# Patient Record
Sex: FEMALE | Race: WHITE | NOT HISPANIC OR LATINO | Employment: FULL TIME | URBAN - METROPOLITAN AREA
[De-identification: names, ages, dates, MRNs, and addresses within clinical notes are randomized per-mention and may not be internally consistent; named-entity substitution may affect disease eponyms.]

---

## 2017-07-03 ENCOUNTER — OFFICE VISIT (OUTPATIENT)
Dept: FAMILY MEDICINE | Facility: CLINIC | Age: 52
End: 2017-07-03
Payer: COMMERCIAL

## 2017-07-03 VITALS
HEIGHT: 69 IN | OXYGEN SATURATION: 97 % | HEART RATE: 80 BPM | DIASTOLIC BLOOD PRESSURE: 68 MMHG | WEIGHT: 198 LBS | SYSTOLIC BLOOD PRESSURE: 102 MMHG | BODY MASS INDEX: 29.33 KG/M2 | TEMPERATURE: 97.7 F

## 2017-07-03 DIAGNOSIS — N76.0 BV (BACTERIAL VAGINOSIS): Primary | ICD-10-CM

## 2017-07-03 DIAGNOSIS — Z12.4 SCREENING FOR MALIGNANT NEOPLASM OF CERVIX: ICD-10-CM

## 2017-07-03 DIAGNOSIS — Z12.31 VISIT FOR SCREENING MAMMOGRAM: ICD-10-CM

## 2017-07-03 DIAGNOSIS — B96.89 BV (BACTERIAL VAGINOSIS): Primary | ICD-10-CM

## 2017-07-03 DIAGNOSIS — R35.0 URINARY FREQUENCY: ICD-10-CM

## 2017-07-03 DIAGNOSIS — N89.8 VAGINAL ITCHING: ICD-10-CM

## 2017-07-03 DIAGNOSIS — Z11.3 SCREEN FOR STD (SEXUALLY TRANSMITTED DISEASE): ICD-10-CM

## 2017-07-03 LAB
ALBUMIN UR-MCNC: NEGATIVE MG/DL
APPEARANCE UR: CLEAR
BILIRUB UR QL STRIP: NEGATIVE
COLOR UR AUTO: YELLOW
GLUCOSE UR STRIP-MCNC: NEGATIVE MG/DL
HGB UR QL STRIP: ABNORMAL
KETONES UR STRIP-MCNC: NEGATIVE MG/DL
LEUKOCYTE ESTERASE UR QL STRIP: NEGATIVE
MICRO REPORT STATUS: ABNORMAL
NITRATE UR QL: NEGATIVE
NON-SQ EPI CELLS #/AREA URNS LPF: ABNORMAL /LPF
PH UR STRIP: 5.5 PH (ref 5–7)
RBC #/AREA URNS AUTO: ABNORMAL /HPF (ref 0–2)
SP GR UR STRIP: 1.02 (ref 1–1.03)
SPECIMEN SOURCE: ABNORMAL
URN SPEC COLLECT METH UR: ABNORMAL
UROBILINOGEN UR STRIP-ACNC: 0.2 EU/DL (ref 0.2–1)
WBC #/AREA URNS AUTO: ABNORMAL /HPF (ref 0–2)
WET PREP SPEC: ABNORMAL

## 2017-07-03 PROCEDURE — 81001 URINALYSIS AUTO W/SCOPE: CPT | Performed by: PHYSICIAN ASSISTANT

## 2017-07-03 PROCEDURE — 87624 HPV HI-RISK TYP POOLED RSLT: CPT | Performed by: PHYSICIAN ASSISTANT

## 2017-07-03 PROCEDURE — 99214 OFFICE O/P EST MOD 30 MIN: CPT | Performed by: PHYSICIAN ASSISTANT

## 2017-07-03 PROCEDURE — 87591 N.GONORRHOEAE DNA AMP PROB: CPT | Performed by: PHYSICIAN ASSISTANT

## 2017-07-03 PROCEDURE — 88175 CYTOPATH C/V AUTO FLUID REDO: CPT | Performed by: PHYSICIAN ASSISTANT

## 2017-07-03 PROCEDURE — 87491 CHLMYD TRACH DNA AMP PROBE: CPT | Performed by: PHYSICIAN ASSISTANT

## 2017-07-03 PROCEDURE — 87210 SMEAR WET MOUNT SALINE/INK: CPT | Performed by: PHYSICIAN ASSISTANT

## 2017-07-03 RX ORDER — METRONIDAZOLE 500 MG/1
500 TABLET ORAL 2 TIMES DAILY
Qty: 14 TABLET | Refills: 0 | Status: SHIPPED | OUTPATIENT
Start: 2017-07-03 | End: 2018-04-05

## 2017-07-03 NOTE — NURSING NOTE
"Chief Complaint   Patient presents with     UTI     possible bladder infection or yeast infection per pt x 1-2 months on and off       Initial /68  Pulse 80  Temp 97.7  F (36.5  C) (Oral)  Ht 5' 9\" (1.753 m)  Wt 198 lb (89.8 kg)  SpO2 97%  Breastfeeding? No  BMI 29.24 kg/m2 Estimated body mass index is 29.24 kg/(m^2) as calculated from the following:    Height as of this encounter: 5' 9\" (1.753 m).    Weight as of this encounter: 198 lb (89.8 kg).  Medication Reconciliation: complete      Giacomo Gooden MA    "

## 2017-07-03 NOTE — LETTER
[facility]  [address] [facility] [id number] [phone]        July 6, 2017    Layne Warren  [address]   [facility] [address]            Dear Layne,    The results of your recent tests were normal.  It was a pleasure to see you at your last appointment.    If you have any questions or concerns, please call myself or my nurse at [phone].    Sincerely,    Chasidy Scott PA-C

## 2017-07-03 NOTE — PATIENT INSTRUCTIONS
Bacterial Vaginosis    You have a vaginal infection called bacterial vaginosis (BV). Both good and bad bacteria are present in a healthy vagina. BV occurs when these bacteria get out of balance. The number of bad bacteria increase. And the number of good bacteria decrease.  BV may or may not cause symptoms. If symptoms do occur, they can include:    Thin, gray, milky-white, or sometimes green discharge    Unpleasant odor or  fishy  smell    Itching, burning, or pain in or around the vagina  It is not known what causes BV, but certain factors can make the problem more likely. This can include:    Douching    Having sex with a new partner    Having sex with more than one partner  BV will sometimes go away on its own. But treatment is usually recommended. This is because untreated BV can increase the risk of more serious health problems such as:    Pelvic inflammatory disease (PID)     delivery (giving birth to a baby early if you re pregnant)    HIV and certain other sexually transmitted diseases (STDs)    Infection after surgery on the reproductive organs  Home care  General care    BV is most often treated with medicines called antibiotics. These may be given as pills or as a vaginal cream. If antibiotics are prescribed, be sure to use them exactly as directed. Also, be sure to complete all of the medicine, even if your symptoms go away.    Avoid douching or having sex during treatment.    If you have sex with a female partner, ask your healthcare provider if she should also be treated.  Prevention    Limit or avoid douching.    Avoid having sex. If you do have sex, then take steps to lower your risk:    Use condoms when having sex.    Limit the number of partners you have sex with.  Follow-up care  Follow up with your healthcare provider, or as advised.  When to seek medical advice  Call your healthcare provider right away if:    You have a fever of 100.4 F (38 C) or higher, or as directed by your  provider.    Your symptoms worsen, or they don t go away within a few days of starting treatment.    You have new pain in the lower belly or pelvic region.    You have side effects that bother you or a reaction to the pills or cream you re prescribed.    You or any partners you have sex with have new symptoms, such as a rash, joint pain, or sores.  Date Last Reviewed: 7/30/2015 2000-2017 [employer]. [address] [place] [address], [place] [address]. All rights reserved. This information is not intended as a substitute for professional medical care. Always follow your healthcare professional's instructions.

## 2017-07-03 NOTE — LETTER
July 17, 2017    Layne Warren  [address]   [place] [address]    Dear Layne,  We are happy to inform you that your PAP smear result from 7/3/17 is normal.  We are now able to do a follow up test on PAP smears. The DNA test is for HPV (Human Papilloma Virus). Cervical cancer is closely linked with certain types of HPV. Your result showed no evidence of high risk HPV.  Therefore we recommend you return in 3 years for your next pap smear and HPV test.  You will still need to return to the clinic every year for an annual exam and other preventive tests.  Please contact the clinic at [phone] with any questions.  Sincerely,    Chasidy Scott PA-C/elvin

## 2017-07-03 NOTE — Clinical Note
Please abstract the following data from this visit with this patient into the appropriate field in Epic:  Colonoscopy done on this date: 10/01/2015 normal, (approximately), by this group:[facility], results were normal, negatve .

## 2017-07-03 NOTE — MR AVS SNAPSHOT
After Visit Summary   7/3/2017    Layne Warren    MRN: [id number]           Patient Information     Date Of Birth          1965        Visit Information        Provider Department      7/3/2017 1:40 PM Chasidy Scott PA-C [facility]        Today's Diagnoses     BV (bacterial vaginosis)    -  1    Urinary frequency        Screening for malignant neoplasm of cervix        Visit for screening mammogram        Screen for STD (sexually transmitted disease)        Vaginal itching          Care Instructions      Bacterial Vaginosis    You have a vaginal infection called bacterial vaginosis (BV). Both good and bad bacteria are present in a healthy vagina. BV occurs when these bacteria get out of balance. The number of bad bacteria increase. And the number of good bacteria decrease.  BV may or may not cause symptoms. If symptoms do occur, they can include:    Thin, gray, milky-white, or sometimes green discharge    Unpleasant odor or  fishy  smell    Itching, burning, or pain in or around the vagina  It is not known what causes BV, but certain factors can make the problem more likely. This can include:    Douching    Having sex with a new partner    Having sex with more than one partner  BV will sometimes go away on its own. But treatment is usually recommended. This is because untreated BV can increase the risk of more serious health problems such as:    Pelvic inflammatory disease (PID)     delivery (giving birth to a baby early if you re pregnant)    HIV and certain other sexually transmitted diseases (STDs)    Infection after surgery on the reproductive organs  Home care  General care    BV is most often treated with medicines called antibiotics. These may be given as pills or as a vaginal cream. If antibiotics are prescribed, be sure to use them exactly as directed. Also, be sure to complete all of the medicine, even if your symptoms go away.    Avoid douching or  having sex during treatment.    If you have sex with a female partner, ask your healthcare provider if she should also be treated.  Prevention    Limit or avoid douching.    Avoid having sex. If you do have sex, then take steps to lower your risk:    Use condoms when having sex.    Limit the number of partners you have sex with.  Follow-up care  Follow up with your healthcare provider, or as advised.  When to seek medical advice  Call your healthcare provider right away if:    You have a fever of 100.4 F (38 C) or higher, or as directed by your provider.    Your symptoms worsen, or they don t go away within a few days of starting treatment.    You have new pain in the lower belly or pelvic region.    You have side effects that bother you or a reaction to the pills or cream you re prescribed.    You or any partners you have sex with have new symptoms, such as a rash, joint pain, or sores.  Date Last Reviewed: 7/30/2015 2000-2017 [employer]. [address] [place] [address]. All rights reserved. This information is not intended as a substitute for professional medical care. Always follow your healthcare professional's instructions.                Follow-ups after your visit        Future tests that were ordered for you today     Open Future Orders        Priority Expected Expires Ordered    *MA Screening Digital Bilateral Routine  7/3/2018 7/3/2017            Who to contact     If you have questions or need follow up information about today's clinic visit or your schedule please contact [facility] directly at [phone].  Normal or non-critical lab and imaging results will be communicated to you by MyChart, letter or phone within 4 business days after the clinic has received the results. If you do not hear from us within 7 days, please contact the clinic through MyChart or phone. If you have a critical or abnormal lab result, we will notify you by phone as soon as  "possible.  Submit refill requests through [employer] or call your pharmacy and they will forward the refill request to us. Please allow 3 business days for your refill to be completed.          Additional Information About Your Visit        Care EveryWhere ID     This is your Care EveryWhere ID. This could be used by other organizations to access your [facility] medical records  [id number]        Your Vitals Were     Pulse Temperature Height Pulse Oximetry Breastfeeding? BMI (Body Mass Index)    80 97.7  F (36.5  C) (Oral) 5' 9\" (1.753 m) 97% No 29.24 kg/m2       Blood Pressure from Last 3 Encounters:   07/03/17 102/68   12/07/15 133/75   10/12/15 117/69    Weight from Last 3 Encounters:   07/03/17 198 lb (89.8 kg)   12/07/15 190 lb (86.2 kg)   10/12/15 186 lb (84.4 kg)              We Performed the Following     Chlamydia trachomatis PCR     HPV High Risk Types DNA Cervical     Neisseria gonorrhoeae PCR     Pap imaged thin layer diagnostic with HPV (select HPV order below)     UA with Microscopic reflex to Culture     Wet prep          Today's Medication Changes          These changes are accurate as of: 7/3/17  2:43 PM.  If you have any questions, ask your nurse or doctor.               Start taking these medicines.        Dose/Directions    metroNIDAZOLE 500 MG tablet   Commonly known as:  FLAGYL   Used for:  BV (bacterial vaginosis)   Started by:  Chasidy Scott PA-C        Dose:  500 mg   Take 1 tablet (500 mg) by mouth 2 times daily With food. No alcohol.   Quantity:  14 tablet   Refills:  0            Where to get your medicines      These medications were sent to [facility] [place], [place] - [address] [facility], [address]  [address] [address] [address]     Phone:  [phone]     metroNIDAZOLE 500 MG tablet                Primary Care Provider Office Phone # Fax #    Cosmo Herndon [phone]       [facility] [address] [facility] " [facility] [address]        Equal Access to Services     [facility] Center[facility] : Hadii aad ku haddinoraharlet Schilling, warafaelada luqadaha, qamauta kaalmacamilo roque. So [facility] [phone].    ATENCIÓN: Si habla español, tiene a kevin disposición servicios gratuitos de asistencia lingüística. Llame al [phone].    We comply with applicable federal civil rights laws and [place] laws. We do not discriminate on the basis of race, color, national origin, age, disability sex, sexual orientation or gender identity.            Thank you!     Thank you for choosing [facility]  for your care. Our goal is always to provide you with excellent care. Hearing back from our patients is one way we can continue to improve our services. Please take a few minutes to complete the written survey that you may receive in the mail after your visit with us. Thank you!             Your Updated Medication List - Protect others around you: Learn how to safely use, store and throw away your medicines at www.disposemymeds.org.          This list is accurate as of: 7/3/17  2:43 PM.  Always use your most recent med list.                   Brand Name Dispense Instructions for use Diagnosis    metroNIDAZOLE 500 MG tablet    FLAGYL    14 tablet    Take 1 tablet (500 mg) by mouth 2 times daily With food. No alcohol.    BV (bacterial vaginosis)       NO ACTIVE MEDICATIONS

## 2017-07-03 NOTE — PROGRESS NOTES
SUBJECTIVE:                                                    Layne Warren is a 51 year old female who presents to clinic today for the following health issues:    URINARY TRACT SYMPTOMS  Onset: 1-2 weeks    Description:   Painful urination (Dysuria): YES  Blood in urine (Hematuria): no   Delay in urine (Hesitency): YES    Intensity: mild    Progression of Symptoms:  same    Accompanying Signs & Symptoms:  Fever/chills: no   Flank pain no   Nausea and vomiting: no   Any vaginal symptoms: none  Abdominal/Pelvic Pain: no     History:   History of frequent UTI's: no   History of kidney stones: no   Sexually Active: YES  Possibility of pregnancy: No    Precipitating factors:   none    Therapies Tried and outcome: Increase fluid intake      itching and burning when she urinates. No fevers or vomiting.   No rash or sores in the area.   Had genital herpes in college. No outbreaks recently at all. Has been so many years she can't remember an outbreak.   This does not feel like herpes to patient, feels different. Would like std screening also but not bloodwork for that. No specific concerns.     No recent periods---has possibly been a year or almost.   No abdominal pain.     MAMMO is due.  colon cancer screening 10/01/2015 normal, Pt is due for PAP.          Problem list and histories reviewed & adjusted, as indicated.  Additional history: as documented    Patient Active Problem List   Diagnosis     CARDIOVASCULAR SCREENING; LDL GOAL LESS THAN 160     Tinea versicolor     Past Surgical History:   Procedure Laterality Date     C ANESTH, SECTION         Social History   Substance Use Topics     Smoking status: Never Smoker     Smokeless tobacco: Never Used     Alcohol use Yes     Family History   Problem Relation Age of Onset     C.A.D. Father          Current Outpatient Prescriptions   Medication Sig Dispense Refill     NO ACTIVE MEDICATIONS        No Known Allergies    ROS:  Constitutional, HEENT, cardiovascular,  "pulmonary, GI, , musculoskeletal, neuro, skin, endocrine and psych systems are negative, except as otherwise noted.    OBJECTIVE:     /68  Pulse 80  Temp 97.7  F (36.5  C) (Oral)  Ht 5' 9\" (1.753 m)  Wt 198 lb (89.8 kg)  SpO2 97%  Breastfeeding? No  BMI 29.24 kg/m2  Body mass index is 29.24 kg/(m^2).  GENERAL: healthy, alert and no distress  RESP: lungs clear to auscultation - no rales, rhonchi or wheezes  CV: regular rate and rhythm, normal S1 S2, no S3 or S4, no murmur, click or rub, no peripheral edema and peripheral pulses strong  ABDOMEN: soft, nontender, no flank pain  MS: no gross musculoskeletal defects noted, no edema  SKIN: no suspicious lesions or rashes  Genitourinary: vaginal mucosa normal, cervix without abnormalities, no rashes or lesions seen. No odor.   NEURO: Normal strength and tone, mentation intact and speech normal  PSYCH: mentation appears normal, affect normal/bright    Results for orders placed or performed in visit on 07/03/17 (from the past 24 hour(s))   UA with Microscopic reflex to Culture   Result Value Ref Range    Color Urine Yellow     Appearance Urine Clear     Glucose Urine Negative NEG mg/dL    Bilirubin Urine Negative NEG    Ketones Urine Negative NEG mg/dL    Specific Gravity Urine 1.025 1.003 - 1.035    pH Urine 5.5 5.0 - 7.0 pH    Protein Albumin Urine Negative NEG mg/dL    Urobilinogen Urine 0.2 0.2 - 1.0 EU/dL    Nitrite Urine Negative NEG    Blood Urine Trace (A) NEG    Leukocyte Esterase Urine Negative NEG    Source Midstream Urine     WBC Urine O - 2 0 - 2 /HPF    RBC Urine O - 2 0 - 2 /HPF    Squamous Epithelial /LPF Urine Few FEW /LPF   Wet prep   Result Value Ref Range    Specimen Description Vagina     Wet Prep (A)      No Trichomonas seen  No yeast seen  Clue cells seen      Micro Report Status FINAL 07/03/2017          ASSESSMENT/PLAN:   ASSESSMENT / PLAN:  (N76.0,  B96.89) BV (bacterial vaginosis)  (primary encounter diagnosis)  Comment: she prefers " oral to gel.  Will start after  per patient.   Plan: metroNIDAZOLE (FLAGYL) 500 MG tablet            (R35.0) Urinary frequency  Comment: ua normal  Plan: UA with Microscopic reflex to Culture            (Z12.4) Screening for malignant neoplasm of cervix  Comment:   Plan: Pap imaged thin layer diagnostic with HPV         (select HPV order below), HPV High Risk Types         DNA Cervical            (Z12.31) Visit for screening mammogram  Comment:   Plan: *MA Screening Digital Bilateral            (Z11.3) Screen for STD (sexually transmitted disease)  Comment:   Plan: Wet prep, Chlamydia trachomatis PCR, Neisseria         gonorrhoeae PCR            (L29.8) Vaginal itching  Comment:   Plan: from bacterial vaginosis, see below    Billin min spent face-to-face with patient. 10 min on history, 5 on exam, 10 on discussing diagnosis and treatment plan.       Patient Instructions     Bacterial Vaginosis    You have a vaginal infection called bacterial vaginosis (BV). Both good and bad bacteria are present in a healthy vagina. BV occurs when these bacteria get out of balance. The number of bad bacteria increase. And the number of good bacteria decrease.  BV may or may not cause symptoms. If symptoms do occur, they can include:    Thin, gray, milky-white, or sometimes green discharge    Unpleasant odor or  fishy  smell    Itching, burning, or pain in or around the vagina  It is not known what causes BV, but certain factors can make the problem more likely. This can include:    Douching    Having sex with a new partner    Having sex with more than one partner  BV will sometimes go away on its own. But treatment is usually recommended. This is because untreated BV can increase the risk of more serious health problems such as:    Pelvic inflammatory disease (PID)     delivery (giving birth to a baby early if you re pregnant)    HIV and certain other sexually transmitted diseases (STDs)    Infection after  surgery on the reproductive organs  Home care  General care    BV is most often treated with medicines called antibiotics. These may be given as pills or as a vaginal cream. If antibiotics are prescribed, be sure to use them exactly as directed. Also, be sure to complete all of the medicine, even if your symptoms go away.    Avoid douching or having sex during treatment.    If you have sex with a female partner, ask your healthcare provider if she should also be treated.  Prevention    Limit or avoid douching.    Avoid having sex. If you do have sex, then take steps to lower your risk:    Use condoms when having sex.    Limit the number of partners you have sex with.  Follow-up care  Follow up with your healthcare provider, or as advised.  When to seek medical advice  Call your healthcare provider right away if:    You have a fever of 100.4 F (38 C) or higher, or as directed by your provider.    Your symptoms worsen, or they don t go away within a few days of starting treatment.    You have new pain in the lower belly or pelvic region.    You have side effects that bother you or a reaction to the pills or cream you re prescribed.    You or any partners you have sex with have new symptoms, such as a rash, joint pain, or sores.  Date Last Reviewed: 7/30/2015 2000-2017 [employer]. [address] [place] [address], [place] [address]. All rights reserved. This information is not intended as a substitute for professional medical care. Always follow your healthcare professional's instructions.                Chasidy Scott PA-C  [facility]

## 2017-07-05 LAB
C TRACH DNA SPEC QL NAA+PROBE: NORMAL
N GONORRHOEA DNA SPEC QL NAA+PROBE: NORMAL
SPECIMEN SOURCE: NORMAL
SPECIMEN SOURCE: NORMAL

## 2017-07-05 NOTE — PROGRESS NOTES
Dear Layne,      It was a pleasure to see you at your recent office visit.  Your test results are listed below.  Chlamydia and gonorrhea negative.         If you have any questions or concerns, please call the clinic at [phone].    Sincerely,  Chaisdy Scott PA-C

## 2017-07-07 LAB
COPATH REPORT: NORMAL
PAP: NORMAL

## 2017-07-10 LAB
FINAL DIAGNOSIS: NORMAL
HPV HR 12 DNA CVX QL NAA+PROBE: NEGATIVE
HPV16 DNA SPEC QL NAA+PROBE: NEGATIVE
HPV18 DNA SPEC QL NAA+PROBE: NEGATIVE
SPECIMEN DESCRIPTION: NORMAL

## 2017-09-07 ENCOUNTER — TELEPHONE (OUTPATIENT)
Dept: FAMILY MEDICINE | Facility: CLINIC | Age: 52
End: 2017-09-07

## 2017-10-02 NOTE — TELEPHONE ENCOUNTER
Patient is calling back regarding getting her mammogram done. She says she knows its due, and will be getting it done after she is finished getting settled in her new home. She did report that she does usually get it done at [place]. She is asking for no more reminders please. Thank you

## 2018-04-04 NOTE — PROGRESS NOTES
SUBJECTIVE:                                                    Layne aWrren is a 52 year old female who presents to clinic today for the following health issues:    MAMMO is due and will call back.    Vaginal Symptoms  Onset: x 4 days    Description:  Vaginal Discharge: none   Itching (Pruritis): YES  Burning sensation:  YES  Odor: no     Accompanying Signs & Symptoms:  Pain with Urination: YES  Abdominal Pain: no   Fever: no     History:   Sexually active: YES  New Partner: no   Possibility of Pregnancy:  No    Precipitating factors:   Recent Antibiotic Use: no     Alleviating factors:  None    Therapies Tried and outcome: None    Burns when urinates.  Just like when she had bacterial vaginosis previously.  Has a h/o genital herpes but no sores and has not had an outbreak in decades.   No fevers.   No flank pain.   No vomiting.   Admits she drinks very little water during the day.   These symptoms started 1-2 days after sex.  This could be a trigger for patient.     No std concerns.         Problem list and histories reviewed & adjusted, as indicated.  Additional history: as documented    Patient Active Problem List   Diagnosis     CARDIOVASCULAR SCREENING; LDL GOAL LESS THAN 160     Tinea versicolor     Past Surgical History:   Procedure Laterality Date     C ANESTH, SECTION         Social History   Substance Use Topics     Smoking status: Never Smoker     Smokeless tobacco: Never Used     Alcohol use Yes     Family History   Problem Relation Age of Onset     C.A.D. Father          Current Outpatient Prescriptions   Medication Sig Dispense Refill     metroNIDAZOLE (FLAGYL) 500 MG tablet Take 1 tablet (500 mg) by mouth 2 times daily With food. No alcohol. 14 tablet 0     NO ACTIVE MEDICATIONS        No Known Allergies    ROS:  Constitutional, HEENT, cardiovascular, pulmonary, GI, , musculoskeletal, neuro, skin, endocrine and psych systems are negative, except as otherwise noted.    OBJECTIVE:     BP  "128/76  Pulse 61  Temp 97.8  F (36.6  C) (Oral)  Resp 16  Ht 5' 9\" (1.753 m)  Wt 193 lb (87.5 kg)  SpO2 100%  Breastfeeding? No  BMI 28.5 kg/m2  Body mass index is 28.5 kg/(m^2).  GENERAL: healthy, alert and no distress  RESP: lungs clear to auscultation - no rales, rhonchi or wheezes  CV: regular rate and rhythm, normal S1 S2, no S3 or S4, no murmur, click or rub, no peripheral edema and peripheral pulses strong  ABDOMEN: {:soft, nontender  MS: no gross musculoskeletal defects noted, no edema  PSYCH: mentation appears normal, affect normal/bright    Results for orders placed or performed in visit on 04/05/18 (from the past 24 hour(s))   *UA reflex to Microscopic and Culture ([facility], [place], operated by [facility] (except [place] and [place])   Result Value Ref Range    Color Urine Yellow     Appearance Urine Clear     Glucose Urine Negative NEG^Negative mg/dL    Bilirubin Urine Negative NEG^Negative    Ketones Urine Negative NEG^Negative mg/dL    Specific Gravity Urine >1.030 1.003 - 1.035    Blood Urine Negative NEG^Negative    pH Urine 5.5 5.0 - 7.0 pH    Protein Albumin Urine Negative NEG^Negative mg/dL    Urobilinogen Urine 0.2 0.2 - 1.0 EU/dL    Nitrite Urine Negative NEG^Negative    Leukocyte Esterase Urine Negative NEG^Negative    Source Midstream Urine    Wet prep   Result Value Ref Range    Specimen Description Vagina     Wet Prep No clue cells seen     Wet Prep No Trichomonas seen     Wet Prep No yeast seen          ASSESSMENT/PLAN:   ASSESSMENT / PLAN:  (R35.0) Urinary frequency  (primary encounter diagnosis)  Comment:   Plan: *UA reflex to Microscopic and Culture ([facility], [place], [facility] (except [facility]), Wet prep, Urine Culture Aerobic         Bacterial            (Z12.31) Visit for screening mammogram  Comment:   Plan:     (N76.0,  B96.89) BV (bacterial vaginosis)  Comment:   Plan: metroNIDAZOLE (FLAGYL) 500 MG tablet        No alcohol  F/u if symptoms do not " improve  Will treat symptomatically    Patient Instructions     Bacterial Vaginosis    You have a vaginal infection called bacterial vaginosis (BV). Both good and bad bacteria are present in a healthy vagina. BV occurs when these bacteria get out of balance. The number of bad bacteria increase. And the number of good bacteria decrease.  BV may or may not cause symptoms. If symptoms do occur, they can include:    Thin, gray, milky-white, or sometimes green discharge    Unpleasant odor or  fishy  smell    Itching, burning, or pain in or around the vagina  It is not known what causes BV, but certain factors can make the problem more likely. This can include:    Douching    Having sex with a new partner    Having sex with more than one partner  BV will sometimes go away on its own. But treatment is usually recommended. This is because untreated BV can increase the risk of more serious health problems such as:    Pelvic inflammatory disease (PID)     delivery (giving birth to a baby early if you re pregnant)    HIV and certain other sexually transmitted diseases (STDs)    Infection after surgery on the reproductive organs  Home care  General care    BV is most often treated with medicines called antibiotics. These may be given as pills or as a vaginal cream. If antibiotics are prescribed, be sure to use them exactly as directed. Also, be sure to complete all of the medicine, even if your symptoms go away.    Avoid douching or having sex during treatment.    If you have sex with a female partner, ask your healthcare provider if she should also be treated.  Prevention    Limit or avoid douching.    Avoid having sex. If you do have sex, then take steps to lower your risk:    Use condoms when having sex.    Limit the number of partners you have sex with.  Follow-up care  Follow up with your healthcare provider, or as advised.  When to seek medical advice  Call your healthcare provider right away if:    You have a  fever of 100.4 F (38 C) or higher, or as directed by your provider.    Your symptoms worsen, or they don t go away within a few days of starting treatment.    You have new pain in the lower belly or pelvic region.    You have side effects that bother you or a reaction to the pills or cream you re prescribed.    You or any partners you have sex with have new symptoms, such as a rash, joint pain, or sores.  Date Last Reviewed: 7/30/2015 2000-2017 [employer]. [address] [place] [address], [place] [address]. All rights reserved. This information is not intended as a substitute for professional medical care. Always follow your healthcare professional's instructions.              Chasidy Scott PA-C  [facility]

## 2018-04-05 ENCOUNTER — OFFICE VISIT (OUTPATIENT)
Dept: FAMILY MEDICINE | Facility: CLINIC | Age: 53
End: 2018-04-05
Payer: COMMERCIAL

## 2018-04-05 VITALS
OXYGEN SATURATION: 100 % | TEMPERATURE: 97.8 F | BODY MASS INDEX: 28.58 KG/M2 | WEIGHT: 193 LBS | HEIGHT: 69 IN | DIASTOLIC BLOOD PRESSURE: 76 MMHG | SYSTOLIC BLOOD PRESSURE: 128 MMHG | RESPIRATION RATE: 16 BRPM | HEART RATE: 61 BPM

## 2018-04-05 DIAGNOSIS — N76.0 BV (BACTERIAL VAGINOSIS): ICD-10-CM

## 2018-04-05 DIAGNOSIS — Z12.31 VISIT FOR SCREENING MAMMOGRAM: ICD-10-CM

## 2018-04-05 DIAGNOSIS — R35.0 URINARY FREQUENCY: Primary | ICD-10-CM

## 2018-04-05 DIAGNOSIS — B96.89 BV (BACTERIAL VAGINOSIS): ICD-10-CM

## 2018-04-05 LAB
ALBUMIN UR-MCNC: NEGATIVE MG/DL
APPEARANCE UR: CLEAR
BILIRUB UR QL STRIP: NEGATIVE
COLOR UR AUTO: YELLOW
GLUCOSE UR STRIP-MCNC: NEGATIVE MG/DL
HGB UR QL STRIP: NEGATIVE
KETONES UR STRIP-MCNC: NEGATIVE MG/DL
LEUKOCYTE ESTERASE UR QL STRIP: NEGATIVE
NITRATE UR QL: NEGATIVE
PH UR STRIP: 5.5 PH (ref 5–7)
SOURCE: NORMAL
SP GR UR STRIP: >1.03 (ref 1–1.03)
SPECIMEN SOURCE: NORMAL
UROBILINOGEN UR STRIP-ACNC: 0.2 EU/DL (ref 0.2–1)
WET PREP SPEC: NORMAL

## 2018-04-05 PROCEDURE — 87088 URINE BACTERIA CULTURE: CPT | Performed by: PHYSICIAN ASSISTANT

## 2018-04-05 PROCEDURE — 87210 SMEAR WET MOUNT SALINE/INK: CPT | Performed by: PHYSICIAN ASSISTANT

## 2018-04-05 PROCEDURE — 87086 URINE CULTURE/COLONY COUNT: CPT | Performed by: PHYSICIAN ASSISTANT

## 2018-04-05 PROCEDURE — 81003 URINALYSIS AUTO W/O SCOPE: CPT | Performed by: PHYSICIAN ASSISTANT

## 2018-04-05 PROCEDURE — 99213 OFFICE O/P EST LOW 20 MIN: CPT | Performed by: PHYSICIAN ASSISTANT

## 2018-04-05 PROCEDURE — 87186 SC STD MICRODIL/AGAR DIL: CPT | Performed by: PHYSICIAN ASSISTANT

## 2018-04-05 RX ORDER — METRONIDAZOLE 500 MG/1
500 TABLET ORAL 2 TIMES DAILY
Qty: 14 TABLET | Refills: 0 | Status: SHIPPED | OUTPATIENT
Start: 2018-04-05 | End: 2018-07-17

## 2018-04-05 NOTE — MR AVS SNAPSHOT
After Visit Summary   2018    Layne Warren    MRN: [id number]           Patient Information     Date Of Birth          1965        Visit Information        Provider Department      2018 2:20 PM Chasidy Scott PA-C [facility]        Today's Diagnoses     Urinary frequency    -  1    Visit for screening mammogram        BV (bacterial vaginosis)          Care Instructions      Bacterial Vaginosis    You have a vaginal infection called bacterial vaginosis (BV). Both good and bad bacteria are present in a healthy vagina. BV occurs when these bacteria get out of balance. The number of bad bacteria increase. And the number of good bacteria decrease.  BV may or may not cause symptoms. If symptoms do occur, they can include:    Thin, gray, milky-white, or sometimes green discharge    Unpleasant odor or  fishy  smell    Itching, burning, or pain in or around the vagina  It is not known what causes BV, but certain factors can make the problem more likely. This can include:    Douching    Having sex with a new partner    Having sex with more than one partner  BV will sometimes go away on its own. But treatment is usually recommended. This is because untreated BV can increase the risk of more serious health problems such as:    Pelvic inflammatory disease (PID)     delivery (giving birth to a baby early if you re pregnant)    HIV and certain other sexually transmitted diseases (STDs)    Infection after surgery on the reproductive organs  Home care  General care    BV is most often treated with medicines called antibiotics. These may be given as pills or as a vaginal cream. If antibiotics are prescribed, be sure to use them exactly as directed. Also, be sure to complete all of the medicine, even if your symptoms go away.    Avoid douching or having sex during treatment.    If you have sex with a female partner, ask your healthcare provider if she should also be  treated.  Prevention    Limit or avoid douching.    Avoid having sex. If you do have sex, then take steps to lower your risk:    Use condoms when having sex.    Limit the number of partners you have sex with.  Follow-up care  Follow up with your healthcare provider, or as advised.  When to seek medical advice  Call your healthcare provider right away if:    You have a fever of 100.4 F (38 C) or higher, or as directed by your provider.    Your symptoms worsen, or they don t go away within a few days of starting treatment.    You have new pain in the lower belly or pelvic region.    You have side effects that bother you or a reaction to the pills or cream you re prescribed.    You or any partners you have sex with have new symptoms, such as a rash, joint pain, or sores.  Date Last Reviewed: 7/30/2015 2000-2017 [employer]. [address] [place] [address]. All rights reserved. This information is not intended as a substitute for professional medical care. Always follow your healthcare professional's instructions.                Follow-ups after your visit        Who to contact     If you have questions or need follow up information about today's clinic visit or your schedule please contact [facility] directly at [phone].  Normal or non-critical lab and imaging results will be communicated to you by MyChart, letter or phone within 4 business days after the clinic has received the results. If you do not hear from us within 7 days, please contact the clinic through MyChart or phone. If you have a critical or abnormal lab result, we will notify you by phone as soon as possible.  Submit refill requests through [employer] or call your pharmacy and they will forward the refill request to us. Please allow 3 business days for your refill to be completed.          Additional Information About Your Visit        Care EveryWhere ID     This is your Care EveryWhere ID. This could be used by  "other organizations to access your [facility] medical records  [id number]        Your Vitals Were     Pulse Temperature Respirations Height Pulse Oximetry Breastfeeding?    61 97.8  F (36.6  C) (Oral) 16 5' 9\" (1.753 m) 100% No    BMI (Body Mass Index)                   28.5 kg/m2            Blood Pressure from Last 3 Encounters:   04/05/18 128/76   07/03/17 102/68   12/07/15 133/75    Weight from Last 3 Encounters:   04/05/18 193 lb (87.5 kg)   07/03/17 198 lb (89.8 kg)   12/07/15 190 lb (86.2 kg)              We Performed the Following     *UA reflex to Microscopic and Culture ([facility] (except [place] and [place])     Urine Culture Aerobic Bacterial     Wet prep          Where to get your medicines      [facility] [address] [facility] [address]  [address] [facility], [facility] [place] [address], [facility] [address]     Phone:  [phone]     metroNIDAZOLE 500 MG tablet          Primary Care Provider Office Phone # Fax #    Cosmo Herndon [phone] [address] [facility] [address]        Equal Access to Services     JOSELYN JAY AH: Hadii aad ku hadasho Soomaali, waaxda luqadaha, qaybta kaalmada adeegyada, waxay idiin hayjazmynen ariane casey. So [facility] [phone].    ATENCIÓN: Si habla español, tiene a kevin disposición servicios gratuitos de asistencia lingüística. Llame al [phone].    We comply with applicable federal civil rights laws and [place] laws. We do not discriminate on the basis of race, color, national origin, age, disability, sex, sexual orientation, or gender identity.            Thank you!     Thank you for choosing [facility]  for your care. Our goal is always to provide you with excellent care. Hearing back from our patients is one way we can continue to improve our services. Please take a few minutes to complete the written survey that you may receive in the mail after your " visit with us. Thank you!             Your Updated Medication List - Protect others around you: Learn how to safely use, store and throw away your medicines at www.disposemymeds.org.          This list is accurate as of 4/5/18  3:07 PM.  Always use your most recent med list.                   Brand Name Dispense Instructions for use Diagnosis    metroNIDAZOLE 500 MG tablet    FLAGYL    14 tablet    Take 1 tablet (500 mg) by mouth 2 times daily With food. No alcohol.    BV (bacterial vaginosis)       NO ACTIVE MEDICATIONS

## 2018-04-05 NOTE — NURSING NOTE
"Chief Complaint   Patient presents with     Vaginal Problem     Possible yeast infection per pt x 4 days        Initial /76  Pulse 61  Temp 97.8  F (36.6  C) (Oral)  Resp 16  Ht 5' 9\" (1.753 m)  Wt 193 lb (87.5 kg)  SpO2 100%  Breastfeeding? No  BMI 28.5 kg/m2 Estimated body mass index is 28.5 kg/(m^2) as calculated from the following:    Height as of this encounter: 5' 9\" (1.753 m).    Weight as of this encounter: 193 lb (87.5 kg).  Medication Reconciliation: complete      Giacomo Gooden MA    "

## 2018-04-05 NOTE — LETTER
April 9, 2018    Layne Warren  [address] [facility][1] [address]        Dear Layne,    It was a pleasure to see you at your recent office visit.  Your test results are listed below.  The antibiotic we put you on covers the bacteria that grew in your culture.  If your symptoms worsen or persist, please call clinic.          If you have any questions or concerns, please call the clinic at [phone].    Sincerely,  Chasidy Scott PA-C    Results for orders placed or performed in visit on 04/05/18   *UA reflex to Microscopic and Culture ([facility] (except [place] and [place])   Result Value Ref Range    Color Urine Yellow     Appearance Urine Clear     Glucose Urine Negative NEG^Negative mg/dL    Bilirubin Urine Negative NEG^Negative    Ketones Urine Negative NEG^Negative mg/dL    Specific Gravity Urine >1.030 1.003 - 1.035    Blood Urine Negative NEG^Negative    pH Urine 5.5 5.0 - 7.0 pH    Protein Albumin Urine Negative NEG^Negative mg/dL    Urobilinogen Urine 0.2 0.2 - 1.0 EU/dL    Nitrite Urine Negative NEG^Negative    Leukocyte Esterase Urine Negative NEG^Negative    Source Midstream Urine    Wet prep   Result Value Ref Range    Specimen Description Vagina     Wet Prep No clue cells seen     Wet Prep No Trichomonas seen     Wet Prep No yeast seen    Urine Culture Aerobic Bacterial   Result Value Ref Range    Specimen Description Midstream Urine     Culture Micro 10,000 to 50,000 colonies/mL  Escherichia coli   (A)        Susceptibility    Escherichia coli - AIDAN     AMPICILLIN <=2 Sensitive ug/mL     CEFAZOLIN* <=4 Sensitive ug/mL      * Cefazolin AIDAN breakpoints are for the treatment of uncomplicated urinary tract infections.  For the treatment of systemic infections, please contact the laboratory for additional testing.     CEFOXITIN <=4 Sensitive ug/mL     CEFTAZIDIME <=1 Sensitive ug/mL     CEFTRIAXONE <=1 Sensitive ug/mL     CIPROFLOXACIN <=0.25 Sensitive ug/mL     GENTAMICIN  <=1 Sensitive ug/mL     LEVOFLOXACIN <=0.12 Sensitive ug/mL     NITROFURANTOIN <=16 Sensitive ug/mL     TOBRAMYCIN <=1 Sensitive ug/mL     Trimethoprim/Sulfa <=1/19 Sensitive ug/mL     AMPICILLIN/SULBACTAM <=2 Sensitive ug/mL     Piperacillin/Tazo <=4 Sensitive ug/mL     CEFEPIME <=1 Sensitive ug/mL

## 2018-04-05 NOTE — PATIENT INSTRUCTIONS
Bacterial Vaginosis    You have a vaginal infection called bacterial vaginosis (BV). Both good and bad bacteria are present in a healthy vagina. BV occurs when these bacteria get out of balance. The number of bad bacteria increase. And the number of good bacteria decrease.  BV may or may not cause symptoms. If symptoms do occur, they can include:    Thin, gray, milky-white, or sometimes green discharge    Unpleasant odor or  fishy  smell    Itching, burning, or pain in or around the vagina  It is not known what causes BV, but certain factors can make the problem more likely. This can include:    Douching    Having sex with a new partner    Having sex with more than one partner  BV will sometimes go away on its own. But treatment is usually recommended. This is because untreated BV can increase the risk of more serious health problems such as:    Pelvic inflammatory disease (PID)     delivery (giving birth to a baby early if you re pregnant)    HIV and certain other sexually transmitted diseases (STDs)    Infection after surgery on the reproductive organs  Home care  General care    BV is most often treated with medicines called antibiotics. These may be given as pills or as a vaginal cream. If antibiotics are prescribed, be sure to use them exactly as directed. Also, be sure to complete all of the medicine, even if your symptoms go away.    Avoid douching or having sex during treatment.    If you have sex with a female partner, ask your healthcare provider if she should also be treated.  Prevention    Limit or avoid douching.    Avoid having sex. If you do have sex, then take steps to lower your risk:    Use condoms when having sex.    Limit the number of partners you have sex with.  Follow-up care  Follow up with your healthcare provider, or as advised.  When to seek medical advice  Call your healthcare provider right away if:    You have a fever of 100.4 F (38 C) or higher, or as directed by your  provider.    Your symptoms worsen, or they don t go away within a few days of starting treatment.    You have new pain in the lower belly or pelvic region.    You have side effects that bother you or a reaction to the pills or cream you re prescribed.    You or any partners you have sex with have new symptoms, such as a rash, joint pain, or sores.  Date Last Reviewed: 7/30/2015 2000-2017 [employer]. [address] [place] [address], [place] [address]. All rights reserved. This information is not intended as a substitute for professional medical care. Always follow your healthcare professional's instructions.

## 2018-04-06 ENCOUNTER — TELEPHONE (OUTPATIENT)
Dept: FAMILY MEDICINE | Facility: CLINIC | Age: 53
End: 2018-04-06

## 2018-04-06 DIAGNOSIS — R35.0 URINARY FREQUENCY: ICD-10-CM

## 2018-04-06 LAB
BACTERIA SPEC CULT: ABNORMAL
SPECIMEN SOURCE: ABNORMAL

## 2018-04-06 RX ORDER — CIPROFLOXACIN 500 MG/1
500 TABLET, FILM COATED ORAL 2 TIMES DAILY
Qty: 14 TABLET | Refills: 0 | Status: SHIPPED | OUTPATIENT
Start: 2018-04-06 | End: 2018-04-13

## 2018-04-06 RX ORDER — CIPROFLOXACIN 500 MG/1
500 TABLET, FILM COATED ORAL 2 TIMES DAILY
Qty: 14 TABLET | Refills: 0 | Status: SHIPPED | OUTPATIENT
Start: 2018-04-06 | End: 2018-04-06

## 2018-04-06 NOTE — TELEPHONE ENCOUNTER
Notes Recorded by Chasidy Scott PA-C on 4/6/2018 at 2:04 PM  PLEASE CALL PATIENT: Dear Layne,      It was a pleasure to see you at your recent office visit.  Your test results are listed below.  URINE DID GROW INFECTION.  It grew e coli.  Please take new antibiotic (you can continue the other one also) and recheck in 3-5 days if still having symptoms. Sooner if worse.         If you have any questions or concerns, please call the clinic at [phone].    Sincerely,  Chasidy Scott PA-C

## 2018-04-06 NOTE — PROGRESS NOTES
PLEASE CALL PATIENT: Dear Layne,      It was a pleasure to see you at your recent office visit.  Your test results are listed below.  URINE DID GROW INFECTION.  It grew e coli.  Please take new antibiotic (you can continue the other one also) and recheck in 3-5 days if still having symptoms. Sooner if worse.         If you have any questions or concerns, please call the clinic at [phone].    Sincerely,  Chasidy Scott PA-C

## 2018-04-06 NOTE — TELEPHONE ENCOUNTER
Pt notified of provider message as written.  Pt verbalized good understanding.  Pt needs prescription sent to [facility].  Prescription resent and clinic pharmacy notified.  Esther Farrar RN

## 2018-04-08 NOTE — PROGRESS NOTES
Dear Layne,      It was a pleasure to see you at your recent office visit.  Your test results are listed below.  The antibiotic we put you on covers the bacteria that grew in your culture.  If your symptoms worsen or persist, please call clinic.          If you have any questions or concerns, please call the clinic at [phone].    Sincerely,  Chasidy Scott PA-C

## 2018-04-09 DIAGNOSIS — R35.0 URINARY FREQUENCY: ICD-10-CM

## 2018-04-09 RX ORDER — CIPROFLOXACIN 500 MG/1
TABLET, FILM COATED ORAL
Qty: 14 TABLET | Refills: 0 | OUTPATIENT
Start: 2018-04-09

## 2018-04-12 ENCOUNTER — TELEPHONE (OUTPATIENT)
Dept: FAMILY MEDICINE | Facility: CLINIC | Age: 53
End: 2018-04-12

## 2018-04-12 NOTE — TELEPHONE ENCOUNTER
Patient is calling stated that the Rx ciprofloxacin (CIPRO) 500 MG tablet  That was given to her is making her very sore. Patient would like to discuss Rx ciprofloxacin (CIPRO) 500 MG tablet with provider or nurse. Patient stated that she will not take the Rx this morning.   Please call to discuss   Thank you

## 2018-04-12 NOTE — TELEPHONE ENCOUNTER
Pt notified of provider message as written.  Pt verbalized good understanding.  Esther Farrar RN

## 2018-04-12 NOTE — TELEPHONE ENCOUNTER
Please have her stop cipro and monitor symptoms. They should improve.  Usually it is one tendon that is affected but just in case have her stop. Chasidy Scott PA-C

## 2018-04-12 NOTE — TELEPHONE ENCOUNTER
See 4-6-18 message.  Pt started Cipro on Monday.  Paperwork she received with said a side effect could be tendon problems.  Pt did have a hard work out on Monday morning and her arms were more sore than normal.  Her arms are even more sore today, she can hardly lift her arms up.  Pt is not having any pain with urination now.    To provider to advise.  Esther Farrar RN

## 2018-04-13 ENCOUNTER — NURSE TRIAGE (OUTPATIENT)
Dept: NURSING | Facility: CLINIC | Age: 53
End: 2018-04-13

## 2018-04-13 ENCOUNTER — TELEPHONE (OUTPATIENT)
Dept: FAMILY MEDICINE | Facility: CLINIC | Age: 53
End: 2018-04-13

## 2018-04-13 ENCOUNTER — OFFICE VISIT (OUTPATIENT)
Dept: URGENT CARE | Facility: URGENT CARE | Age: 53
End: 2018-04-13
Payer: COMMERCIAL

## 2018-04-13 VITALS
TEMPERATURE: 97.4 F | SYSTOLIC BLOOD PRESSURE: 120 MMHG | HEART RATE: 88 BPM | BODY MASS INDEX: 28.06 KG/M2 | RESPIRATION RATE: 16 BRPM | OXYGEN SATURATION: 96 % | DIASTOLIC BLOOD PRESSURE: 72 MMHG | WEIGHT: 190 LBS

## 2018-04-13 DIAGNOSIS — N89.8 VAGINAL IRRITATION: ICD-10-CM

## 2018-04-13 DIAGNOSIS — R30.0 DYSURIA: Primary | ICD-10-CM

## 2018-04-13 DIAGNOSIS — N30.01 ACUTE CYSTITIS WITH HEMATURIA: Primary | ICD-10-CM

## 2018-04-13 LAB
ALBUMIN UR-MCNC: NEGATIVE MG/DL
APPEARANCE UR: ABNORMAL
BACTERIA #/AREA URNS HPF: ABNORMAL /HPF
BILIRUB UR QL STRIP: NEGATIVE
COLOR UR AUTO: YELLOW
GLUCOSE UR STRIP-MCNC: NEGATIVE MG/DL
HGB UR QL STRIP: ABNORMAL
KETONES UR STRIP-MCNC: NEGATIVE MG/DL
LEUKOCYTE ESTERASE UR QL STRIP: ABNORMAL
MUCOUS THREADS #/AREA URNS LPF: PRESENT /LPF
NITRATE UR QL: NEGATIVE
NON-SQ EPI CELLS #/AREA URNS LPF: ABNORMAL /LPF
PH UR STRIP: 5 PH (ref 5–7)
RBC #/AREA URNS AUTO: ABNORMAL /HPF
SOURCE: ABNORMAL
SP GR UR STRIP: >1.03 (ref 1–1.03)
SPECIMEN SOURCE: NORMAL
UROBILINOGEN UR STRIP-ACNC: 0.2 EU/DL (ref 0.2–1)
WBC #/AREA URNS AUTO: ABNORMAL /HPF
WET PREP SPEC: NORMAL

## 2018-04-13 PROCEDURE — 81001 URINALYSIS AUTO W/SCOPE: CPT | Performed by: FAMILY MEDICINE

## 2018-04-13 PROCEDURE — 87210 SMEAR WET MOUNT SALINE/INK: CPT | Performed by: FAMILY MEDICINE

## 2018-04-13 PROCEDURE — 99214 OFFICE O/P EST MOD 30 MIN: CPT | Performed by: FAMILY MEDICINE

## 2018-04-13 PROCEDURE — 87086 URINE CULTURE/COLONY COUNT: CPT | Performed by: FAMILY MEDICINE

## 2018-04-13 RX ORDER — NITROFURANTOIN 25; 75 MG/1; MG/1
100 CAPSULE ORAL 2 TIMES DAILY
Qty: 14 CAPSULE | Refills: 0 | Status: SHIPPED | OUTPATIENT
Start: 2018-04-13 | End: 2018-04-13

## 2018-04-13 RX ORDER — GLYCERIN/MIN OIL/POLYCARBOPHIL
1 GEL WITH APPLICATOR (GRAM) VAGINAL DAILY PRN
Qty: 35 G | Refills: 1 | Status: SHIPPED | OUTPATIENT
Start: 2018-04-13 | End: 2018-07-17

## 2018-04-13 RX ORDER — NITROFURANTOIN 25; 75 MG/1; MG/1
100 CAPSULE ORAL 2 TIMES DAILY
Qty: 14 CAPSULE | Refills: 0 | Status: SHIPPED | OUTPATIENT
Start: 2018-04-13 | End: 2018-07-17

## 2018-04-13 NOTE — TELEPHONE ENCOUNTER
Patient calling, she was advised to stop taking Cipro due to side effects. She is still having symptoms of a UTI and is wondering if something different can be sent to pharmacy.

## 2018-04-13 NOTE — MR AVS SNAPSHOT
After Visit Summary   4/13/2018    Layne Warren    MRN: [id number]           Patient Information     Date Of Birth          1965        Visit Information        Provider Department      4/13/2018 8:05 PM Estrellita Howe MD [facility]        Today's Diagnoses     Dysuria    -  1    Vaginal irritation           Follow-ups after your visit        Who to contact     If you have questions or need follow up information about today's clinic visit or your schedule please contact [facility] directly at [phone].  Normal or non-critical lab and imaging results will be communicated to you by MyChart, letter or phone within 4 business days after the clinic has received the results. If you do not hear from us within 7 days, please contact the clinic through MyChart or phone. If you have a critical or abnormal lab result, we will notify you by phone as soon as possible.  Submit refill requests through [facility] or call your pharmacy and they will forward the refill request to us. Please allow 3 business days for your refill to be completed.          Additional Information About Your Visit        Care EveryWhere ID     This is your Care EveryWhere ID. This could be used by other organizations to access your [facility] medical records  [id number]        Your Vitals Were     Pulse Temperature Respirations Pulse Oximetry BMI (Body Mass Index)       88 97.4  F (36.3  C) (Oral) 16 96% 28.06 kg/m2        Blood Pressure from Last 3 Encounters:   04/13/18 120/72   04/05/18 128/76   07/03/17 102/68    Weight from Last 3 Encounters:   04/13/18 190 lb (86.2 kg)   04/05/18 193 lb (87.5 kg)   07/03/17 198 lb (89.8 kg)              We Performed the Following     UA with Microscopic reflex to Culture     Urine Culture Aerobic Bacterial     Wet prep          Today's Medication Changes          These changes are accurate as of 4/13/18  9:07 PM.  If you have any questions, ask your nurse  or doctor.               Start taking these medicines.        Dose/Directions    nitroFURantoin (macrocrystal-monohydrate) 100 MG capsule   Commonly known as:  MACROBID   Used for:  Dysuria   Started by:  Estrellita Howe MD        Dose:  100 mg   Take 1 capsule (100 mg) by mouth 2 times daily   Quantity:  14 capsule   Refills:  0       replens Gel   Used for:  Vaginal irritation   Started by:  Estrellita Howe MD        Dose:  1 Application   Place 1 Application vaginally daily as needed   Quantity:  35 g   Refills:  1            Where to get your medicines      These medications were sent to [id number] - [employer] [place], [place] - [address]  [address] [address] [place] [facility] [address]    Hours:  24-hours Phone:  [phone]     nitroFURantoin (macrocrystal-monohydrate) 100 MG capsule    replens Gel                Primary Care Provider Office Phone # Fax #    Cosmo Herndon [phone] [address] [facility] [address]        Equal Access to Services     [facility]: Hadii aad ku hadasho Soomaali, waaxda luqadaha, qaybta kaalmada adeegyada, camilo anthony . So [facility] [phone].    ATENCIÓN: Si habla español, tiene a kevin disposición servicios gratuitos de asistencia lingüística. [facility] [phone].    We comply with applicable federal civil rights laws and [place] laws. We do not discriminate on the basis of race, color, national origin, age, disability, sex, sexual orientation, or gender identity.            Thank you!     Thank you for choosing [facility]  for your care. Our goal is always to provide you with excellent care. Hearing back from our patients is one way we can continue to improve our services. Please take a few minutes to complete the written survey that you may receive in the mail after your visit with us. Thank you!             Your Updated  Medication List - Protect others around you: Learn how to safely use, store and throw away your medicines at www.disposemymeds.org.          This list is accurate as of 4/13/18  9:07 PM.  Always use your most recent med list.                   Brand Name Dispense Instructions for use Diagnosis    metroNIDAZOLE 500 MG tablet    FLAGYL    14 tablet    Take 1 tablet (500 mg) by mouth 2 times daily With food. No alcohol.    BV (bacterial vaginosis)       nitroFURantoin (macrocrystal-monohydrate) 100 MG capsule    MACROBID    14 capsule    Take 1 capsule (100 mg) by mouth 2 times daily    Dysuria       NO ACTIVE MEDICATIONS           replens Gel     35 g    Place 1 Application vaginally daily as needed    Vaginal irritation

## 2018-04-14 LAB
BACTERIA SPEC CULT: NO GROWTH
SPECIMEN SOURCE: NORMAL

## 2018-04-14 NOTE — TELEPHONE ENCOUNTER
Reason for Disposition    [1] Taking antibiotic > 72 hours (3 days) for UTI AND [2] painful urination or frequency not improved    Additional Information    Negative: [1] Unable to urinate (or only a few drops) > 4 hours AND     [2] bladder feels very full (e.g., palpable bladder or strong urge to urinate)    Negative: Passing pure blood or large blood clots (i.e., size > a dime)  (Exceptions: flecks, small strands, or pinkish-red color)    Negative: Patient sounds very sick or weak to the triager    Negative: [1] SEVERE pain (e.g., excruciating) AND [2] no improvement 2 hours after pain medications    Negative: [1] Fever > 100.5 F (38.1 C) AND [2] new onset since starting antibiotics    Negative: [1] Side (flank) or lower back pain AND [2] new onset since starting antibiotics    Negative: [1] Taking antibiotic > 24 hours for UTI AND [2] flank or lower back pain worsening    Negative: [1] Vomiting 2 or more times AND [2] interferes with taking oral antibiotic    Negative: [1] Taking antibiotic > 24 hours for UTI (urinary tract or bladder infection) AND [2] fever persists    Protocols used: URINARY TRACT INFECTION ON ANTIBIOTIC FOLLOW-UP CALL - FEMALE-ADULT-  Patient was taken off antibiotics due to adverse reaction, and now uti symptoms are back.  She will go to  within twenty-four hours.  Marichuy Robison RN  [facility]

## 2018-04-14 NOTE — PROGRESS NOTES
SUBJECTIVE:   Layne Warren is a 52 year old female who presents to clinic today for the following health issues:      Concern - side effect to cipro   Onset: 3 days     Description:    patient was put on cipro for uti started to have muscle discomfort     Intensity: mild    Progression of Symptoms:  same    Accompanying Signs & Symptoms:  Thinks she should be in another form of medication     Previous history of similar problem:   no    Precipitating factors:   Worsened by: nothing     Alleviating factors:  Improved by: stopped mediation     Therapies Tried and outcome: stopped antibiotic     Was seen in clinic 8 days ago  Was treated for BV empirically with flagyl which patient finished  But then Friday was prescribed with ciprofloxacin however patient was out of town (urine culture grew and which is why patient was called)  Monday morning worked out and was very severe work out   Patient didn't get to take it until 4 days ago Monday night  2 days ago however felt more sore than she did initially.  She took ibuprofen so she could sleep because her arms ached.  Last dose of Cipro was Thursday morning.    Symptoms have improved.     Having some vaginal discomfort  Foul-smelling or fishy odor: NO  Cheese like discharge: NO  Worsening: NO  Itchy: NO   Possibility of pregnancy: No  Pelvic pain: No  Concern about STD exposure, unprotected sex, or high risk sexual behavior: No    I had recommended STD testing: YES and patient declined    Problem list and histories reviewed & adjusted, as indicated.  Additional history: as documented    Problem list, Medication list, Allergies, and Medical/Social/Surgical histories reviewed in [facility] and updated as appropriate.    ROS:  Constitutional, HEENT, cardiovascular, pulmonary, gi and gu systems are negative, except as otherwise noted.    OBJECTIVE:                                                    /72  Pulse 88  Temp 97.4  F (36.3  C) (Oral)  Resp 16  Wt 190 lb (86.2  kg)  SpO2 96%  BMI 28.06 kg/m2  Body mass index is 28.06 kg/(m^2).  GENERAL: healthy, alert and no distress  EYES: Eyes grossly normal to inspection  ABDOMEN: soft, nontender, no hepatosplenomegaly, no masses and bowel sounds normal   (female): female external genitalia appeared erythematous with vaginal dryness , normal urethral meatus, , No cervical motion tenderness  Vaginal discharge appeared: scant whitish discharge   MS: no gross musculoskeletal defects noted, no edema  SKIN: no suspicious lesions or rashes  NEURO: Normal strength and tone, mentation intact and speech normal  PSYCH: mentation appears normal, affect normal/bright    Diagnostic Test Results:  Results for orders placed or performed in visit on 04/13/18 (from the past 24 hour(s))   UA with Microscopic reflex to Culture   Result Value Ref Range    Color Urine Yellow     Appearance Urine Slightly Cloudy     Glucose Urine Negative NEG^Negative mg/dL    Bilirubin Urine Negative NEG^Negative    Ketones Urine Negative NEG^Negative mg/dL    Specific Gravity Urine >1.030 1.003 - 1.035    pH Urine 5.0 5.0 - 7.0 pH    Protein Albumin Urine Negative NEG^Negative mg/dL    Urobilinogen Urine 0.2 0.2 - 1.0 EU/dL    Nitrite Urine Negative NEG^Negative    Blood Urine Trace (A) NEG^Negative    Leukocyte Esterase Urine Trace (A) NEG^Negative    Source Midstream Urine     WBC Urine 5-10 (A) OTO5^0 - 5 /HPF    RBC Urine O - 2 OTO2^O - 2 /HPF    Squamous Epithelial /LPF Urine Moderate (A) FEW^Few /LPF    Bacteria Urine Many (A) NEG^Negative /HPF    Mucous Urine Present (A) NEG^Negative /LPF   Wet prep   Result Value Ref Range    Specimen Description Cervix     Wet Prep No clue cells seen     Wet Prep No yeast seen     Wet Prep No Trichomonas seen         ASSESSMENT/PLAN:                                                        ICD-10-CM    1. Dysuria R30.0 UA with Microscopic reflex to Culture     Urine Culture Aerobic Bacterial     nitroFURantoin,  macrocrystal-monohydrate, (MACROBID) 100 MG capsule     DISCONTINUED: nitroFURantoin, macrocrystal-monohydrate, (MACROBID) 100 MG capsule   2. Vaginal irritation N89.8 Wet prep     Vaginal Lubricant (REPLENS) GEL     Concern about ciprofloxacin adverse side effect - although based on patient's history seems like could be delayed myalgias from her boxing workout.  She doesn't think so because she stated that she's done this workout since January - although earlier she said may be a little bit harder than her usual.  She wants to avoid ciprofloxacin for now because of concern about tendon rupture  Will switch to macrobid  Vaginal dryness noted- may be related to patient perimenopausal state. Trial Replens. She's had blood clots before so she would like to avoid estrogen creams. Recommend follow up with primary care provider if symptoms persist   Recommend follow up with primary care provider if no relief, sooner if worse  Adverse reactions of medications discussed.  Over the counter medications discussed.   Aware to come back in if with worsening symptoms or if no relief despite treatment plan  Patient voiced understanding and had no further questions.     >10 minutes of the 20 minute visit was spent counseling the patient on her diagnosis and treatment plan face to face  See above    Estrellita Howe MD  [facility]

## 2018-04-14 NOTE — TELEPHONE ENCOUNTER
----- Message from Feliberto Maravilla sent at 4/13/2018  7:08 PM CDT -----  Reason for call:  Patient reporting a symptom    Symptom or request: E-coli symptoms    Duration (how long have symptoms been present): About a week    Have you been treated for this before? Yes    Additional comments: Was put on meds for e-coli but having side effects so was taken off of it. But now, the symptoms have came back. Wanting to know if she can get on a different medication. Thank you.    Phone Number patient can be reached at:  Home number on file [phone] (home)    Best Time:  Asap.    Can we leave a detailed message on this number:  YES    Call taken on 4/13/2018 at 7:05 PM by Feliberto Maravilla

## 2018-04-15 RX ORDER — NITROFURANTOIN 25; 75 MG/1; MG/1
100 CAPSULE ORAL 2 TIMES DAILY
Qty: 10 CAPSULE | Refills: 0 | Status: SHIPPED | OUTPATIENT
Start: 2018-04-15 | End: 2018-04-20

## 2018-04-16 NOTE — TELEPHONE ENCOUNTER
Pt states she ended up speaking with triage nurse on Friday and then went to urgent care where they did a pap and ua and gave her a different med.  She is doing better now.  Esther Farrar RN

## 2018-07-12 NOTE — PROGRESS NOTES
SUBJECTIVE:   Layne Warren is a 52 year old female who presents to clinic today for the following health issues:      Check spots on both arm.   She spends a lot of time in the sun and concerned about 2 lesions. She is unaware if they have changed recently. She noticed a brown rough texture in the lesion on her left arm. The right arm has a darker colored lesion.       Problem list and histories reviewed & adjusted, as indicated.  Additional history: as documented    Patient Active Problem List   Diagnosis     CARDIOVASCULAR SCREENING; LDL GOAL LESS THAN 160     Tinea versicolor     Past Surgical History:   Procedure Laterality Date     C ANESTH, SECTION         Social History   Substance Use Topics     Smoking status: Never Smoker     Smokeless tobacco: Never Used     Alcohol use Yes     Family History   Problem Relation Age of Onset     C.A.D. Father          Current Outpatient Prescriptions   Medication Sig Dispense Refill     NO ACTIVE MEDICATIONS        Allergies   Allergen Reactions     Ciprofloxacin      Not sure - patient had muscle aches.        Reviewed and updated as needed this visit by clinical staff       Reviewed and updated as needed this visit by Provider         ROS:  Constitutional, HEENT, cardiovascular, pulmonary, gi and gu systems are negative, except as otherwise noted.    OBJECTIVE:     /76  Pulse 76  Temp 97.5  F (36.4  C) (Oral)  Resp 18  Wt 192 lb (87.1 kg)  SpO2 97%  BMI 28.35 kg/m2  Body mass index is 28.35 kg/(m^2).  GENERAL: healthy, alert and no distress  SKIN: left arm: just above the elbow there is a rough light brown colored lesion, aprox 5 mm in size, irregular borders. The lesion on the right forearm is small, aprox 3 mm in diameter, dark in color, but borders are regular.   She has multiple solar lentigos on her chest / arms and obvious sun damage over her body.   PSYCH: mentation appears normal, affect normal/bright    Diagnostic Test Results:  none      ASSESSMENT/PLAN:       1. Atypical pigmented skin lesion  With her multiple areas of sun damage, I feel she would be a good candidate of regular exams in Dermatology.   The lesion on the posterior arm is the most concerning for early skin cancer. She will make her appointment in Dermatology in the near future.   - DERMATOLOGY REFERRAL    2. Need for Tdap vaccination  - TDAP, IM (10 - 64 YRS) - Adacel Kristen M. Kehr, PA-C  [facility]

## 2018-07-17 ENCOUNTER — OFFICE VISIT (OUTPATIENT)
Dept: FAMILY MEDICINE | Facility: CLINIC | Age: 53
End: 2018-07-17
Payer: COMMERCIAL

## 2018-07-17 ENCOUNTER — HEALTH MAINTENANCE LETTER (OUTPATIENT)
Age: 53
End: 2018-07-17

## 2018-07-17 VITALS
OXYGEN SATURATION: 97 % | SYSTOLIC BLOOD PRESSURE: 121 MMHG | DIASTOLIC BLOOD PRESSURE: 76 MMHG | TEMPERATURE: 97.5 F | RESPIRATION RATE: 18 BRPM | HEART RATE: 76 BPM | BODY MASS INDEX: 28.35 KG/M2 | WEIGHT: 192 LBS

## 2018-07-17 DIAGNOSIS — Z23 NEED FOR TDAP VACCINATION: ICD-10-CM

## 2018-07-17 DIAGNOSIS — L81.9 ATYPICAL PIGMENTED SKIN LESION: Primary | ICD-10-CM

## 2018-07-17 PROCEDURE — 90715 TDAP VACCINE 7 YRS/> IM: CPT | Performed by: PHYSICIAN ASSISTANT

## 2018-07-17 PROCEDURE — 90471 IMMUNIZATION ADMIN: CPT | Performed by: PHYSICIAN ASSISTANT

## 2018-07-17 PROCEDURE — 99213 OFFICE O/P EST LOW 20 MIN: CPT | Mod: 25 | Performed by: PHYSICIAN ASSISTANT

## 2018-07-17 ASSESSMENT — PAIN SCALES - GENERAL: PAINLEVEL: NO PAIN (0)

## 2018-07-17 NOTE — MR AVS SNAPSHOT
After Visit Summary   7/17/2018    Layne Warren    MRN: [id number]           Patient Information     Date Of Birth          1965        Visit Information        Provider Department      7/17/2018 7:40 AM Kehr, Kristen M, PA-C [facility]        Today's Diagnoses     Atypical pigmented skin lesion    -  1    Need for Tdap vaccination           Follow-ups after your visit        Additional Services     DERMATOLOGY REFERRAL       Your provider has referred you to: [facility]: [facility] [phone]   [email or web address]    Please be aware that coverage of these services is subject to the terms and limitations of your health insurance plan.  Call member services at your health plan with any benefit or coverage questions.      Please bring the following with you to your appointment:    (1) Any X-Rays, CTs or MRIs which have been performed.  Contact the facility where they were done to arrange for  prior to your scheduled appointment.    (2) List of current medications  (3) This referral request   (4) Any documents/labs given to you for this referral                  Your next 10 appointments already scheduled     Jul 24, 2018  7:45 AM CDT   MA SCREENING DIGITAL BILATERAL with [id number]   [facility]    [address] [place] [address] [phone]           Do not use any powder, lotion or deodorant under your arms or on your breast. If you do, we will ask you to remove it before your exam.  Wear comfortable, two-piece clothing.  If you have any allergies, tell your care team.  Bring any previous mammograms from other facilities or have them mailed to the breast center.              Who to contact     If you have questions or need follow up information about today's clinic visit or your schedule please contact [facility] directly at  [phone].  Normal or non-critical lab and imaging results will be communicated to you by MyChart, letter or phone within 4 business days after the clinic has received the results. If you do not hear from us within 7 days, please contact the clinic through MyChart or phone. If you have a critical or abnormal lab result, we will notify you by phone as soon as possible.  Submit refill requests through [employer] or call your pharmacy and they will forward the refill request to us. Please allow 3 business days for your refill to be completed.          Additional Information About Your Visit        Care EveryWhere ID     This is your Care EveryWhere ID. This could be used by other organizations to access your [facility] [place] medical records  [id number]        Your Vitals Were     Pulse Temperature Respirations Pulse Oximetry BMI (Body Mass Index)       76 97.5  F (36.4  C) (Oral) 18 97% 28.35 kg/m2        Blood Pressure from Last 3 Encounters:   07/17/18 121/76   04/13/18 120/72   04/05/18 128/76    Weight from Last 3 Encounters:   07/17/18 192 lb (87.1 kg)   04/13/18 190 lb (86.2 kg)   04/05/18 193 lb (87.5 kg)              We Performed the Following     DERMATOLOGY REFERRAL     TDAP, IM (10 - 64 YRS) - Adacel        Primary Care Provider Office Phone # Fax #    Cosmo Herndon [phone] [address] [facility] [address]        Equal Access to Services     [facility] : Hadii aad ku hadasho Soomaali, waaxda luqadaha, qaybta kaalmada adeegyada, camilo garcia adechester anthony . So [facility] [phone].    ATENCIÓN: Si habla español, tiene a kevin disposición servicios gratuitos de asistencia lingüística. Heladio al [phone].    We comply with applicable federal civil rights laws and [place] laws. We do not discriminate on the basis of race, color, national origin, age, disability, sex, sexual orientation, or gender identity.            Thank you!     Thank you for choosing [facility]  for your care. Our goal is always to provide you with excellent care. Hearing back from our patients is one way we can continue to improve our services. Please take a few minutes to complete the written survey that you may receive in the mail after your visit with us. Thank you!             Your Updated Medication List - Protect others around you: Learn how to safely use, store and throw away your medicines at www.disposemymeds.org.          This list is accurate as of 7/17/18  8:03 AM.  Always use your most recent med list.                   Brand Name Dispense Instructions for use Diagnosis    NO ACTIVE MEDICATIONS

## 2018-07-17 NOTE — NURSING NOTE
"Chief Complaint   Patient presents with     Derm Problem     Health Maintenance       Initial /76  Pulse 76  Temp 97.5  F (36.4  C) (Oral)  Resp 18  Wt 192 lb (87.1 kg)  SpO2 97%  BMI 28.35 kg/m2 Estimated body mass index is 28.35 kg/(m^2) as calculated from the following:    Height as of 4/5/18: 5' 9\" (1.753 m).    Weight as of this encounter: 192 lb (87.1 kg).  Medication Reconciliation: complete    AQUILES Adorno MA    "

## 2018-07-24 ENCOUNTER — RADIANT APPOINTMENT (OUTPATIENT)
Dept: MAMMOGRAPHY | Facility: CLINIC | Age: 53
End: 2018-07-24
Payer: COMMERCIAL

## 2018-07-24 DIAGNOSIS — Z12.31 VISIT FOR SCREENING MAMMOGRAM: ICD-10-CM

## 2018-07-24 PROCEDURE — 77063 BREAST TOMOSYNTHESIS BI: CPT | Mod: TC

## 2018-07-24 PROCEDURE — 77067 SCR MAMMO BI INCL CAD: CPT | Mod: TC

## 2019-03-21 ENCOUNTER — TELEPHONE (OUTPATIENT)
Dept: FAMILY MEDICINE | Facility: CLINIC | Age: 54
End: 2019-03-21

## 2019-03-21 NOTE — TELEPHONE ENCOUNTER
Left message on answering machine for patient to call back to [phone].  Esther Farrar BSN, RN

## 2019-03-21 NOTE — TELEPHONE ENCOUNTER
Reason for Call:  Other prescription    Detailed comments: patient is calling because she states she has an yeast infection and was seen before and would like a prescription. Please would like a call back.    Phone Number Patient can be reached at: Home number on file [phone] (home)    Best Time: anytime     Can we leave a detailed message on this number? YES    Call taken on 3/21/2019 at 9:15 AM by Brandy Islas

## 2019-03-21 NOTE — TELEPHONE ENCOUNTER
Advised pt I do not see diflucan for yeast on her med list.  She states she only comes to our clinic so whatever she was previously given should be on her med list.  Advised pt she has been given flagyl for bacterial vaginosis in the past.  She said she is having the same sx as when she was previously given this med.  She has had sx of itching and burning in genital area since Tuesday.  She denies any discharge.  Pt requesting prescription without an appointment.  Esther DELGADILLON, RN

## 2019-03-21 NOTE — TELEPHONE ENCOUNTER
Spoke with patient.  Appointment made for next Tuesday with Sofy Yepez NP OB/GYN  Sarah.  Adriana Van RN

## 2019-03-21 NOTE — TELEPHONE ENCOUNTER
She needs either an e-visit or an appointment, medication will not be given without this thanks, heraclio

## 2019-03-26 ENCOUNTER — OFFICE VISIT (OUTPATIENT)
Dept: OBGYN | Facility: CLINIC | Age: 54
End: 2019-03-26
Payer: COMMERCIAL

## 2019-03-26 VITALS
SYSTOLIC BLOOD PRESSURE: 131 MMHG | WEIGHT: 197.2 LBS | OXYGEN SATURATION: 98 % | TEMPERATURE: 98.4 F | DIASTOLIC BLOOD PRESSURE: 81 MMHG | HEART RATE: 70 BPM | BODY MASS INDEX: 29.89 KG/M2 | HEIGHT: 68 IN

## 2019-03-26 DIAGNOSIS — B37.31 YEAST VAGINITIS: ICD-10-CM

## 2019-03-26 DIAGNOSIS — L29.2 VULVAR ITCHING: Primary | ICD-10-CM

## 2019-03-26 LAB
SPECIMEN SOURCE: ABNORMAL
WET PREP SPEC: ABNORMAL

## 2019-03-26 PROCEDURE — 99203 OFFICE O/P NEW LOW 30 MIN: CPT | Performed by: NURSE PRACTITIONER

## 2019-03-26 PROCEDURE — 87210 SMEAR WET MOUNT SALINE/INK: CPT | Performed by: NURSE PRACTITIONER

## 2019-03-26 RX ORDER — FLUCONAZOLE 150 MG/1
150 TABLET ORAL ONCE
Qty: 1 TABLET | Refills: 0 | Status: SHIPPED | OUTPATIENT
Start: 2019-03-26 | End: 2019-03-26

## 2019-03-26 ASSESSMENT — MIFFLIN-ST. JEOR: SCORE: 1556.96

## 2019-03-26 ASSESSMENT — PAIN SCALES - GENERAL: PAINLEVEL: NO PAIN (0)

## 2019-03-26 NOTE — PROGRESS NOTES
"  SUBJECTIVE:   Layne Warren is a 53 year old female who presents to clinic today for the following health issues:      Vaginal Symptoms  Onset: 2019    Description:  Vaginal Discharge: none   Itching (Pruritis): YES  Burning sensation:  YES  Odor: no     Accompanying Signs & Symptoms:  Pain with Urination: no  Abdominal Pain: no   Fever: no     History:   Sexually active: YES  New Partner: no   Possibility of Pregnancy:  No    Precipitating factors:   Recent Antibiotic Use: no     Alleviating factors:  none    Therapies Tried and outcome: none    Denies increase in vaginal discharge. No abnormal urinary symptoms. Burning and itching primarily external on the vulva. Started the next morning after intercourse. No changes with products, no STI concerns. Cycles irregular.     Problem list and histories reviewed & adjusted, as indicated.  Additional history: as documented    Patient Active Problem List   Diagnosis     CARDIOVASCULAR SCREENING; LDL GOAL LESS THAN 160     Tinea versicolor     Past Surgical History:   Procedure Laterality Date     C ANESTH, SECTION         Social History     Tobacco Use     Smoking status: Never Smoker     Smokeless tobacco: Never Used   Substance Use Topics     Alcohol use: Yes     Family History   Problem Relation Age of Onset     C.A.D. Father            Reviewed and updated as needed this visit by clinical staff       Reviewed and updated as needed this visit by Provider         ROS:  Constitutional, HEENT, cardiovascular, pulmonary, gi and gu systems are negative, except as otherwise noted.    OBJECTIVE:     /81 (BP Location: Right arm, Patient Position: Sitting, Cuff Size: Adult Regular)   Pulse 70   Temp 98.4  F (36.9  C) (Oral)   Ht 1.734 m (5' 8.25\")   Wt 89.4 kg (197 lb 3.2 oz)   SpO2 98%   BMI 29.76 kg/m    Body mass index is 29.76 kg/m .  GENERAL: healthy, alert and no distress  RESP: lungs clear to auscultation - no rales, rhonchi or wheezes  CV: " regular rate and rhythm, normal S1 S2, no S3 or S4, no murmur, click or rub, no peripheral edema and peripheral pulses strong  ABDOMEN: soft, nontender, no hepatosplenomegaly, no masses and bowel sounds normal   (female): normal female external genitalia-mild erythema on the vulva and to the left of the introitus, normal urethral meatus, vaginal mucosa, normal cervix without masses or discharge  MS: no gross musculoskeletal defects noted, no edema  SKIN: no suspicious lesions or rashes  PSYCH: mentation appears normal, affect normal/bright    Diagnostic Test Results:  Results for orders placed or performed in visit on 03/26/19 (from the past 24 hour(s))   Wet prep   Result Value Ref Range    Specimen Description Vagina     Wet Prep No Trichomonas seen     Wet Prep No clue cells seen     Wet Prep Yeast seen (A)        ASSESSMENT/PLAN:   1. Vulvar itching  - Wet prep    2. Yeast vaginitis  Discussed results and prescription sent. Also discussed home relief measures. Return to clinic PRN.  - fluconazole (DIFLUCAN) 150 MG tablet; Take 1 tablet (150 mg) by mouth once for 1 dose  Dispense: 1 tablet; Refill: 0    SUSANA Neri [facility]

## 2019-04-01 ENCOUNTER — TELEPHONE (OUTPATIENT)
Dept: OBGYN | Facility: CLINIC | Age: 54
End: 2019-04-01

## 2019-04-01 ENCOUNTER — NURSE TRIAGE (OUTPATIENT)
Dept: NURSING | Facility: CLINIC | Age: 54
End: 2019-04-01

## 2019-04-01 DIAGNOSIS — N76.0 ACUTE VAGINITIS: Primary | ICD-10-CM

## 2019-04-01 RX ORDER — FLUCONAZOLE 150 MG/1
150 TABLET ORAL ONCE
Qty: 1 TABLET | Refills: 0 | Status: SHIPPED | OUTPATIENT
Start: 2019-04-01 | End: 2019-04-01

## 2019-04-01 NOTE — TELEPHONE ENCOUNTER
Took prescribed dose of Diflucan and was feeling better.  Had intercourse last night and the burning and itching have returned.  Would like provider recommendation.  Routed to Provider Pool.

## 2019-04-01 NOTE — TELEPHONE ENCOUNTER
Clinic Action Needed:  Please contact patient at [phone]; okay to leave message at that number.  Reason for Call:  Took prescribed dose of Diflucan and was feeling better.  Had intercourse last night and the burning and itching have returned.  Would like provider recommendation.  Preferred pharmacy is [facility].  Routed to:  Provider Pool    Please close encounter when completed.

## 2019-04-01 NOTE — TELEPHONE ENCOUNTER
Will send refill x 1. If symptoms persist, will need to be seen. Recommend no intercourse for 7-10 days. Sofy MEZA CNP

## 2019-11-26 ENCOUNTER — OFFICE VISIT (OUTPATIENT)
Dept: URGENT CARE | Facility: URGENT CARE | Age: 54
End: 2019-11-26
Payer: COMMERCIAL

## 2019-11-26 VITALS
TEMPERATURE: 98 F | HEART RATE: 80 BPM | SYSTOLIC BLOOD PRESSURE: 129 MMHG | OXYGEN SATURATION: 98 % | DIASTOLIC BLOOD PRESSURE: 81 MMHG | BODY MASS INDEX: 28.07 KG/M2 | WEIGHT: 186 LBS

## 2019-11-26 DIAGNOSIS — N89.8 VAGINAL ITCHING: Primary | ICD-10-CM

## 2019-11-26 LAB
SPECIMEN SOURCE: NORMAL
WET PREP SPEC: NORMAL

## 2019-11-26 PROCEDURE — 87210 SMEAR WET MOUNT SALINE/INK: CPT | Performed by: PHYSICIAN ASSISTANT

## 2019-11-26 PROCEDURE — 99213 OFFICE O/P EST LOW 20 MIN: CPT | Performed by: PHYSICIAN ASSISTANT

## 2019-11-27 NOTE — PROGRESS NOTES
S: 54 yo female presents for 53-year-old female comes in for a vaginal itching and very localized dysuria at the urethral opening for a few days.  She used Monistat suppositories for 3 days, last dose was last night.  She also has a hip history of herpes and feels like she may be getting an outbreak although she has no sores present now.  She denies any urinary frequency or urgency.  She feels that this is not a urinary tract infection.  No back pain.  No abdominal pain.  No nausea or vomiting.  No rash.  No abnormal vaginal discharge.      Allergies   Allergen Reactions     Ciprofloxacin      Not sure - patient had muscle aches.        Past Medical History:   Diagnosis Date     Herpes, genital        No current outpatient medications on file prior to visit.  No current facility-administered medications on file prior to visit.       Social History     Tobacco Use     Smoking status: Never Smoker     Smokeless tobacco: Never Used   Substance Use Topics     Alcohol use: Yes       ROS:  CONSTITUTIONAL: Negative for fatigue or fever.  EYES: Negative for eye problems.  ENT: neg for ST.  RESP: neg for cough.  CV: Negative for chest pains.  GI: Negative for vomiting.  MUSCULOSKELETAL:  Negative for significant muscle or joint pains.  NEUROLOGIC: Negative for headaches.  SKIN: Negative for rash.  PSYCH: Normal mentation for age.  - as above    OBJECTIVE:  /81   Pulse 80   Temp 98  F (36.7  C) (Oral)   Wt 84.4 kg (186 lb)   SpO2 98%   BMI 28.07 kg/m    GENERAL APPEARANCE: Healthy, alert and no distress.  EYES:Conjunctiva/sclera clear.  NECK: Supple, moving head and neck freely   RESP: Breathing comfortably CV: Regular rate and rhythm, normal S1 S2, no murmur noted.  NEURO: Awake, alert    SKIN: No rashes    Results for orders placed or performed in visit on 11/26/19   Wet prep     Status: None   Result Value Ref Range    Specimen Description Vagina     Wet Prep No Trichomonas seen     Wet Prep No clue cells seen      Wet Prep No yeast seen     Wet Prep Few  WBC'S seen              ASSESSMENT:     ICD-10-CM    1. Vaginal itching N89.8 Wet prep         PLAN: She used Monistat for the last 3 days which would obscure the results for yeast.  Offered to get urine sample to make sure no urinary tract infection.  She states however she  just peed here.  Offered to do future UA order but declines.  At this point she wants to observe and will return to urgent care on Friday if still symptomatic.  Offered to give Valtrex for possible impending herpes outbreak over the next few days but again declines.  I have discussed clinical findings with patient.  Symptomatic care is discussed.  I have discussed the possibility of  worsening symptoms and to RTC or ER if they occur.  All questions are answered and patient is in agreement with plan.   Patient care instructions are given to at the end of visit.   Lots of rest and fluids.    Toya Crocker PA-C

## 2019-12-01 ENCOUNTER — OFFICE VISIT (OUTPATIENT)
Dept: URGENT CARE | Facility: URGENT CARE | Age: 54
End: 2019-12-01
Payer: COMMERCIAL

## 2019-12-01 VITALS
DIASTOLIC BLOOD PRESSURE: 80 MMHG | BODY MASS INDEX: 27.92 KG/M2 | WEIGHT: 185 LBS | OXYGEN SATURATION: 100 % | TEMPERATURE: 98 F | HEART RATE: 70 BPM | SYSTOLIC BLOOD PRESSURE: 110 MMHG

## 2019-12-01 DIAGNOSIS — R81 GLUCOSURIA: ICD-10-CM

## 2019-12-01 DIAGNOSIS — R39.89 URINE TROUBLES: ICD-10-CM

## 2019-12-01 DIAGNOSIS — N30.01 ACUTE CYSTITIS WITH HEMATURIA: ICD-10-CM

## 2019-12-01 DIAGNOSIS — R73.9 ELEVATED BLOOD SUGAR: Primary | ICD-10-CM

## 2019-12-01 DIAGNOSIS — N89.8 VAGINAL ITCHING: ICD-10-CM

## 2019-12-01 LAB
ALBUMIN UR-MCNC: NEGATIVE MG/DL
APPEARANCE UR: ABNORMAL
BACTERIA #/AREA URNS HPF: ABNORMAL /HPF
BILIRUB UR QL STRIP: NEGATIVE
COLOR UR AUTO: YELLOW
GLUCOSE BLD-MCNC: 210 MG/DL (ref 70–99)
GLUCOSE UR STRIP-MCNC: 100 MG/DL
HBA1C MFR BLD: 10.7 % (ref 0–5.6)
HGB UR QL STRIP: ABNORMAL
KETONES UR STRIP-MCNC: NEGATIVE MG/DL
LEUKOCYTE ESTERASE UR QL STRIP: ABNORMAL
NITRATE UR QL: NEGATIVE
NON-SQ EPI CELLS #/AREA URNS LPF: ABNORMAL /LPF
PH UR STRIP: 6 PH (ref 5–7)
RBC #/AREA URNS AUTO: ABNORMAL /HPF
SOURCE: ABNORMAL
SP GR UR STRIP: 1.01 (ref 1–1.03)
SPECIMEN SOURCE: NORMAL
UROBILINOGEN UR STRIP-ACNC: 0.2 EU/DL (ref 0.2–1)
WBC #/AREA URNS AUTO: >100 /HPF
WBC CLUMPS #/AREA URNS HPF: PRESENT /HPF
WET PREP SPEC: NORMAL

## 2019-12-01 PROCEDURE — 87210 SMEAR WET MOUNT SALINE/INK: CPT | Performed by: FAMILY MEDICINE

## 2019-12-01 PROCEDURE — 36415 COLL VENOUS BLD VENIPUNCTURE: CPT | Performed by: FAMILY MEDICINE

## 2019-12-01 PROCEDURE — 87186 SC STD MICRODIL/AGAR DIL: CPT | Performed by: FAMILY MEDICINE

## 2019-12-01 PROCEDURE — 83036 HEMOGLOBIN GLYCOSYLATED A1C: CPT | Performed by: FAMILY MEDICINE

## 2019-12-01 PROCEDURE — 81001 URINALYSIS AUTO W/SCOPE: CPT | Performed by: FAMILY MEDICINE

## 2019-12-01 PROCEDURE — 87088 URINE BACTERIA CULTURE: CPT | Performed by: FAMILY MEDICINE

## 2019-12-01 PROCEDURE — 82947 ASSAY GLUCOSE BLOOD QUANT: CPT | Performed by: FAMILY MEDICINE

## 2019-12-01 PROCEDURE — 99214 OFFICE O/P EST MOD 30 MIN: CPT | Performed by: FAMILY MEDICINE

## 2019-12-01 PROCEDURE — 87086 URINE CULTURE/COLONY COUNT: CPT | Performed by: FAMILY MEDICINE

## 2019-12-01 RX ORDER — NITROFURANTOIN 25; 75 MG/1; MG/1
100 CAPSULE ORAL 2 TIMES DAILY
Qty: 14 CAPSULE | Refills: 0 | Status: SHIPPED | OUTPATIENT
Start: 2019-12-01 | End: 2019-12-13

## 2019-12-01 NOTE — PATIENT INSTRUCTIONS
Patient Education     New-Onset Hyperglycemia (Diabetes Suspected)  Your blood sugar is high today. This is called hyperglycemia. It means there is too much glucose (sugar) in your blood. This can have several possible causes. These include taking certain medicines, being under stress, or having an infection. The most common cause is diabetes. Diabetes develops when your body doesn't make enough insulin, which is a hormone that helps control blood sugar. Tests can be done to help find the cause of hyperglycemia. To bring your blood sugar down, you may be given insulin.     Hyperglycemia occurs when glucose can t enter cells, so it builds up in the bloodstream instead.   When your body is working normally, the food you eat is digested and turned into sugar. This sugar goes into your bloodstream. Insulin helps sugar move from the bloodstream into the body s cells. There it is used as fuel. When you have diabetes, the sugar can t enter the cells. It stays in the blood, causing high blood sugar. If not controlled, diabetes can cause long-term health problems. Even if you don t have symptoms, it can harm your blood vessels, heart, and other organs. Over time, uncontrolled hyperglycemia can make a heart attack or stroke more likely.  Follow-up care  Follow up with your healthcare provider as advised. To determine the underlying cause of the hyperglycemia, you likely need more tests. You will be given more information about these tests. If needed, your healthcare provider will refer you to a team that specializes in diabetes care. The diabetes care team can help you learn about and make the changes needed to manage your hyperglycemia. Talk with your healthcare provider about starting an exercise program and meeting with a dietitian. These actions can help control high blood sugar.  When to seek medical advice  Call your healthcare provider right away if any of these occur:    Excessive thirst or hunger    Frequent need  to urinate    Losing weight without trying    Feeling tired    Nausea or vomiting    Itchy, dry skin    Dizziness    Confusion    Abdominal pain    Fruity-smelling breath    Deep or rapid breathing  Date Last Reviewed: 6/1/2016 2000-2018 [employer]. [address] [place] [address], [place] [address]. All rights reserved. This information is not intended as a substitute for professional medical care. Always follow your healthcare professional's instructions.

## 2019-12-01 NOTE — PROGRESS NOTES
"Chief complaint: dysuria    2 weeks ago had vaginal itching and some burning with urinationi  Not sure if it was yeast infection  Was taking monistat - wasn't clearing with it  Saw clinic 5 days ago and hat a wet prep which was negative    However the past couple of days having dysuria urgency maybe some itching  Urine smelled \"funny\"  Vaginal discharge or concerns: No  Fever chills: None  Nausea vomiting: None  Flank Pain: None  Patient denies possibility of pregnancy  Denies possibility of STD, declined STD testing.    Patient has history of herpes and doesn't think this is an outbreak     Problem list and histories reviewed & adjusted, as indicated.  Additional history: as documented    Problem list, Medication list, Allergies, and Medical/Social/Surgical histories reviewed in EPIC and updated as appropriate.    ROS:  Constitutional, HEENT, cardiovascular, pulmonary, gi and gu systems are negative, except as otherwise noted.    OBJECTIVE:                                                      /80   Pulse 70   Temp 98  F (36.7  C) (Oral)   Wt 83.9 kg (185 lb)   SpO2 100%   Breastfeeding No   BMI 27.92 kg/m      GENERAL: healthy, alert and no distress  NECK: no adenopathy, no asymmetry, masses, or scars and thyroid normal to palpation  RESP: lungs clear to auscultation - no rales, rhonchi or wheezes  CV: regular rate and rhythm, normal S1 S2, no S3 or S4, no murmur, click or rub, no peripheral edema and peripheral pulses strong  ABDOMEN: soft, nontender, no hepatosplenomegaly, no masses and bowel sounds normal  MS: no gross musculoskeletal defects noted, no edema    Diagnostic Test Results:  Results for orders placed or performed in visit on 12/01/19 (from the past 24 hour(s))   UA reflex to Microscopic and Culture   Result Value Ref Range    Color Urine Yellow     Appearance Urine Slightly Cloudy     Glucose Urine 100 (A) NEG^Negative mg/dL    Bilirubin Urine Negative NEG^Negative    Ketones Urine " Negative NEG^Negative mg/dL    Specific Gravity Urine 1.010 1.003 - 1.035    Blood Urine Moderate (A) NEG^Negative    pH Urine 6.0 5.0 - 7.0 pH    Protein Albumin Urine Negative NEG^Negative mg/dL    Urobilinogen Urine 0.2 0.2 - 1.0 EU/dL    Nitrite Urine Negative NEG^Negative    Leukocyte Esterase Urine Moderate (A) NEG^Negative    Source Midstream Urine    Urine Microscopic   Result Value Ref Range    WBC Urine >100 (A) OTO5^0 - 5 /HPF    RBC Urine 10-25 (A) OTO2^O - 2 /HPF    WBC Clumps Present (A) NEG^Negative /HPF    Squamous Epithelial /LPF Urine Few FEW^Few /LPF    Bacteria Urine Moderate (A) NEG^Negative /HPF        ASSESSMENT/PLAN:                                                        ICD-10-CM    1. Elevated blood sugar R73.9    2. Urine troubles R39.89 UA reflex to Microscopic and Culture     Urine Microscopic     Urine Culture Aerobic Bacterial   3. Glucosuria R81 Glucose whole blood     Hemoglobin A1c     AMBULATORY ADULT DIABETES EDUCATOR REFERRAL   4. Vaginal itching N89.8 Wet prep   5. Acute cystitis with hematuria N30.01 nitroFURantoin macrocrystal-monohydrate (MACROBID) 100 MG capsule     Prescribed with macrobid  Aware to go to ER or come in immediately if with any fever chills nausea vomiting or flank pain.  Adverse reactions of medications discussed.  Over the counter medications discussed.   Aware to come back in if with worsening symptoms or if no relief despite treatment plan  Patient voiced understanding and had no further questions.     Vaginal itching/dysuria - not new- has seen obgyn - wet prep negative - offered pelvic patient declined  If symptoms persist she will follow up with obgyn    Glucosuria noted - random blood sugar 210 and A1c >10  Diagnostic for DM however patient also has been doing intermittent fasting for the past 3 months  Diet and lifestyle modifications discussed  She needs to follow up with primary care provider within a week- she will make appointment  Alarm signs or  symptoms discussed, if present recommend go to ER   She is very hesitant with starting medication - I told her based on her levels this would be advisable that she get started on meds.  She will discuss with primary care provider and plans on doing a fasting labwork with primary care provider -   She is interested in DM education- referral placed.     MD Estrellita Hess MD  [facility]

## 2019-12-02 LAB
BACTERIA SPEC CULT: ABNORMAL
SPECIMEN SOURCE: ABNORMAL

## 2019-12-03 ENCOUNTER — TELEPHONE (OUTPATIENT)
Dept: FAMILY MEDICINE | Facility: CLINIC | Age: 54
End: 2019-12-03

## 2019-12-03 NOTE — TELEPHONE ENCOUNTER
Diabetes Education Scheduling Outreach #1:    Call to patient to schedule. No answer/busy.    Plan for 2nd outreach attempt within 2 business days.    Esther Wei  [facility]  Diabetes and Nutrition Scheduling

## 2019-12-05 NOTE — TELEPHONE ENCOUNTER
Diabetes Education Scheduling Outreach #2:    Call to patient to schedule. Patient declined to schedule for now. She is seeing Dr. Black on 12/13 and wants to recheck her glucose levels. Once she receives the results and if it is determined that she has diabetes again, she will call us to schedule.    Esther Garcia [facility]  Diabetes and Nutrition Scheduling

## 2019-12-06 NOTE — PROGRESS NOTES
Subjective     Layne Warren is a 53 year old female who presents to clinic today for the following health issues:    AME Tillman is a 53 y.o female who presents for follow-up from Urgent Care 2 weeks ago where she was evaluated for UTI symptoms.  At the visit she was found to have glucose in her urine.  A1C was 10.7, random glucose 210.  She has never been dx'd with diabetes and wants to have her labs rechecked.  States she feels fine and finds it hard to belueve that she would have diabetes.  She had a previous DM education referral placed but wants to have her labs rechecked first.  She denies urinary frequency, h/o UTI or yeast infections, or excessive thirst.  She does intermittent fasting.  Does not exercise routinely.  Has Fhx of T2DM in paternal grandmother.  Drinks water and coffee with little creamer.        Patient Active Problem List   Diagnosis     CARDIOVASCULAR SCREENING; LDL GOAL LESS THAN 160     Tinea versicolor     Past Surgical History:   Procedure Laterality Date     C ANESTH, SECTION       TUBAL LIGATION         Social History     Tobacco Use     Smoking status: Never Smoker     Smokeless tobacco: Never Used   Substance Use Topics     Alcohol use: Yes     Alcohol/week: 1.0 standard drinks     Types: 1 Cans of beer per week     Comment: 1 beer a week      Family History   Problem Relation Age of Onset     C.A.D. Father      Diabetes Paternal Grandmother          No current outpatient medications on file.     Allergies   Allergen Reactions     Ciprofloxacin      Not sure - patient had muscle aches.        Reviewed and updated as needed this visit by Provider         Review of Systems   Constitutional: Negative for chills, fatigue and fever.   Respiratory: Negative for cough and shortness of breath.    Cardiovascular: Negative for chest pain.   Endocrine: Negative for polydipsia, polyphagia and polyuria.   Neurological: Negative for numbness and paresthesias.            Objective    BP  "118/60   Pulse 73   Temp 98.5  F (36.9  C) (Oral)   Ht 1.727 m (5' 8\")   Wt 83 kg (183 lb)   BMI 27.83 kg/m    Body mass index is 27.83 kg/m .  Physical Exam  Vitals signs reviewed.   Constitutional:       Appearance: Normal appearance. She is well-developed.   HENT:      Head: Normocephalic.   Cardiovascular:      Rate and Rhythm: Normal rate and regular rhythm.   Pulmonary:      Effort: Pulmonary effort is normal.      Breath sounds: Normal breath sounds.   Skin:     General: Skin is warm and dry.   Neurological:      Mental Status: She is alert and oriented to person, place, and time.   Psychiatric:         Mood and Affect: Mood normal.         Behavior: Behavior normal.                Assessment & Plan     1. Type 2 diabetes mellitus with hyperglycemia, without long-term current use of insulin (H)  - discussed that we can recheck labs and my next recommendation would be to see Diabetes Educator (referral has already been placed).  Discussed Metformin but she wants to wait on this and we will consider starting in the future.   - Comprehensive metabolic panel  - Hemoglobin A1c  - CBC with platelets  - Albumin Random Urine Quantitative with Creat Ratio    2. Screening for hyperlipidemia  - Lipid panel reflex to direct LDL Fasting    3. Encounter for screening mammogram for breast cancer  - *MA Screening Digital Bilateral; Future     BMI:   Estimated body mass index is 27.83 kg/m  as calculated from the following:    Height as of this encounter: 1.727 m (5' 8\").    Weight as of this encounter: 83 kg (183 lb).   Weight management plan: Discussed healthy diet and exercise guidelines        Return in about 4 months (around 4/13/2020) for T2DM.    Imani Black NP  [facility]    "

## 2019-12-13 ENCOUNTER — OFFICE VISIT (OUTPATIENT)
Dept: FAMILY MEDICINE | Facility: CLINIC | Age: 54
End: 2019-12-13
Payer: COMMERCIAL

## 2019-12-13 VITALS
WEIGHT: 183 LBS | DIASTOLIC BLOOD PRESSURE: 60 MMHG | HEART RATE: 73 BPM | BODY MASS INDEX: 27.74 KG/M2 | SYSTOLIC BLOOD PRESSURE: 118 MMHG | TEMPERATURE: 98.5 F | HEIGHT: 68 IN

## 2019-12-13 DIAGNOSIS — E11.65 TYPE 2 DIABETES MELLITUS WITH HYPERGLYCEMIA, WITHOUT LONG-TERM CURRENT USE OF INSULIN (H): Primary | ICD-10-CM

## 2019-12-13 DIAGNOSIS — Z13.220 SCREENING FOR HYPERLIPIDEMIA: ICD-10-CM

## 2019-12-13 DIAGNOSIS — Z12.31 ENCOUNTER FOR SCREENING MAMMOGRAM FOR BREAST CANCER: ICD-10-CM

## 2019-12-13 LAB
ALBUMIN SERPL-MCNC: 3.9 G/DL (ref 3.4–5)
ALP SERPL-CCNC: 130 U/L (ref 40–150)
ALT SERPL W P-5'-P-CCNC: 49 U/L (ref 0–50)
ANION GAP SERPL CALCULATED.3IONS-SCNC: 5 MMOL/L (ref 3–14)
AST SERPL W P-5'-P-CCNC: 33 U/L (ref 0–45)
BASOPHILS # BLD AUTO: 0.1 10E9/L (ref 0–0.2)
BASOPHILS NFR BLD AUTO: 1 %
BILIRUB SERPL-MCNC: 0.4 MG/DL (ref 0.2–1.3)
BUN SERPL-MCNC: 21 MG/DL (ref 7–30)
CALCIUM SERPL-MCNC: 9.4 MG/DL (ref 8.5–10.1)
CHLORIDE SERPL-SCNC: 103 MMOL/L (ref 94–109)
CHOLEST SERPL-MCNC: 290 MG/DL
CO2 SERPL-SCNC: 28 MMOL/L (ref 20–32)
CREAT SERPL-MCNC: 0.85 MG/DL (ref 0.52–1.04)
CREAT UR-MCNC: 250 MG/DL
DIFFERENTIAL METHOD BLD: NORMAL
EOSINOPHIL # BLD AUTO: 0.2 10E9/L (ref 0–0.7)
EOSINOPHIL NFR BLD AUTO: 4 %
ERYTHROCYTE [DISTWIDTH] IN BLOOD BY AUTOMATED COUNT: 12 % (ref 10–15)
GFR SERPL CREATININE-BSD FRML MDRD: 78 ML/MIN/{1.73_M2}
GLUCOSE SERPL-MCNC: 274 MG/DL (ref 70–99)
HBA1C MFR BLD: 10.8 % (ref 0–5.6)
HCT VFR BLD AUTO: 44.2 % (ref 35–47)
HDLC SERPL-MCNC: 47 MG/DL
HGB BLD-MCNC: 15.2 G/DL (ref 11.7–15.7)
LDLC SERPL CALC-MCNC: 171 MG/DL
LYMPHOCYTES # BLD AUTO: 2.6 10E9/L (ref 0.8–5.3)
LYMPHOCYTES NFR BLD AUTO: 52 %
MCH RBC QN AUTO: 30 PG (ref 26.5–33)
MCHC RBC AUTO-ENTMCNC: 34.4 G/DL (ref 31.5–36.5)
MCV RBC AUTO: 87 FL (ref 78–100)
MICROALBUMIN UR-MCNC: 24 MG/L
MICROALBUMIN/CREAT UR: 9.76 MG/G CR (ref 0–25)
MONOCYTES # BLD AUTO: 0.4 10E9/L (ref 0–1.3)
MONOCYTES NFR BLD AUTO: 8 %
NEUTROPHILS # BLD AUTO: 1.8 10E9/L (ref 1.6–8.3)
NEUTROPHILS NFR BLD AUTO: 35 %
NONHDLC SERPL-MCNC: 243 MG/DL
PLATELET # BLD AUTO: 196 10E9/L (ref 150–450)
PLATELET # BLD EST: NORMAL 10*3/UL
POTASSIUM SERPL-SCNC: 4.4 MMOL/L (ref 3.4–5.3)
PROT SERPL-MCNC: 8 G/DL (ref 6.8–8.8)
RBC # BLD AUTO: 5.07 10E12/L (ref 3.8–5.2)
RBC MORPH BLD: NORMAL
SODIUM SERPL-SCNC: 136 MMOL/L (ref 133–144)
TRIGL SERPL-MCNC: 361 MG/DL
WBC # BLD AUTO: 5.1 10E9/L (ref 4–11)

## 2019-12-13 PROCEDURE — 85025 COMPLETE CBC W/AUTO DIFF WBC: CPT | Performed by: NURSE PRACTITIONER

## 2019-12-13 PROCEDURE — 82043 UR ALBUMIN QUANTITATIVE: CPT | Performed by: NURSE PRACTITIONER

## 2019-12-13 PROCEDURE — 83036 HEMOGLOBIN GLYCOSYLATED A1C: CPT | Performed by: NURSE PRACTITIONER

## 2019-12-13 PROCEDURE — 99213 OFFICE O/P EST LOW 20 MIN: CPT | Performed by: NURSE PRACTITIONER

## 2019-12-13 PROCEDURE — 36415 COLL VENOUS BLD VENIPUNCTURE: CPT | Performed by: NURSE PRACTITIONER

## 2019-12-13 PROCEDURE — 80053 COMPREHEN METABOLIC PANEL: CPT | Performed by: NURSE PRACTITIONER

## 2019-12-13 PROCEDURE — 80061 LIPID PANEL: CPT | Performed by: NURSE PRACTITIONER

## 2019-12-13 ASSESSMENT — ENCOUNTER SYMPTOMS
FATIGUE: 0
POLYPHAGIA: 0
CHILLS: 0
FEVER: 0
SHORTNESS OF BREATH: 0
COUGH: 0
NUMBNESS: 0
PARESTHESIAS: 0
POLYDIPSIA: 0

## 2019-12-13 ASSESSMENT — MIFFLIN-ST. JEOR: SCORE: 1488.58

## 2019-12-13 NOTE — NURSING NOTE
"Chief Complaint   Patient presents with     LAB REQUEST     requesting lab work to check for diabetes     Health Maintenance     pended orders, preventive , PHQ2 Zoster       Initial /60   Pulse 73   Temp 98.5  F (36.9  C) (Oral)   Ht 1.727 m (5' 8\")   Wt 83 kg (183 lb)   BMI 27.83 kg/m   Estimated body mass index is 27.83 kg/m  as calculated from the following:    Height as of this encounter: 1.727 m (5' 8\").    Weight as of this encounter: 83 kg (183 lb).    Esther Jaimes, BI    "

## 2020-01-02 ENCOUNTER — ALLIED HEALTH/NURSE VISIT (OUTPATIENT)
Dept: EDUCATION SERVICES | Facility: CLINIC | Age: 55
End: 2020-01-02
Payer: COMMERCIAL

## 2020-01-02 DIAGNOSIS — E11.9 DIABETES MELLITUS WITHOUT COMPLICATION (H): ICD-10-CM

## 2020-01-02 DIAGNOSIS — E11.9 DIABETES MELLITUS, TYPE 2 (H): Primary | ICD-10-CM

## 2020-01-02 PROCEDURE — 99207 ZZC DROP WITH A PROCEDURE: CPT

## 2020-01-02 PROCEDURE — G0108 DIAB MANAGE TRN  PER INDIV: HCPCS

## 2020-01-02 RX ORDER — METFORMIN HCL 500 MG
500 TABLET, EXTENDED RELEASE 24 HR ORAL 2 TIMES DAILY WITH MEALS
Qty: 180 TABLET | Refills: 1 | Status: SHIPPED | OUTPATIENT
Start: 2020-01-02 | End: 2021-01-27

## 2020-01-02 RX ORDER — LANCETS
EACH MISCELLANEOUS
Qty: 102 EACH | Refills: 11 | Status: SHIPPED | OUTPATIENT
Start: 2020-01-02

## 2020-01-02 NOTE — LETTER
"    1/2/2020         RE: Layne Warren  [address] [facility] [address]        Dear Vandana,  She is coming to see you to help with continuing to lose weight by not intermittent fasting, she has a web site that is on facebook, and everyone raves about the weight loss with diabetes this way.  She was hesitant at first to make changes, but after 1 hr of education, willing to make some changes and come and see you.  She really will do well.     Thank you for referring your patient, Layne Warren, [facility] Please see a copy of my visit note below.    Diabetes Self-Management Education & Support    Diabetes Education Self Management & Training    SUBJECTIVE/OBJECTIVE:  Presents for: Initial Assessment for new diagnosis  Accompanied by: Self  Diabetes education in the past 24mo: No  Focus of Visit: Patient Unsure  Diabetes type: Type 2  Disease course: Worsening  Diabetes management related comments/concerns: intermintant fasting  Other concerns:: None  Cultural Influences/Ethnic Background:        Diabetes Symptoms & Complications  Blurred vision: No  Fatigue: No  Neuropathy: No  Foot ulcerations: No  Polydipsia: No  Polyphagia: No  Polyuria: Yes  Visual change: Yes  Weakness: No  Weight loss: Yes  Slow healing wounds: No  Recent Infection(s): No  Symptom course: Stable  Weight trend: Decreasing steadily(is on intermittent fasting and has lost weight over the last 2 yrs.)  Autonomic neuropathy: No  CVA: No  Heart disease: No  Nephropathy: No  Peripheral neuropathy: No  Peripheral Vascular Disease: No  Retinopathy: No    Patient Problem List and Family Medical History reviewed for relevant medical history, current medical status, and diabetes risk factors.    Vitals:  There were no vitals taken for this visit.  Estimated body mass index is 27.83 kg/m  as calculated from the following:    Height as of 12/13/19: 1.727 m (5' 8\").    Weight as of 12/13/19: 83 kg (183 lb).   Last 3 " BP:   BP Readings from Last 3 Encounters:   12/13/19 118/60   12/01/19 110/80   11/26/19 129/81       History   Smoking Status     Never Smoker   Smokeless Tobacco     Never Used       Labs:  Lab Results   Component Value Date    A1C 10.8 12/13/2019     Lab Results   Component Value Date     12/13/2019     Lab Results   Component Value Date     12/13/2019     HDL Cholesterol   Date Value Ref Range Status   12/13/2019 47 (L) >49 mg/dL Final   ]  GFR Estimate   Date Value Ref Range Status   12/13/2019 78 >60 mL/min/[1.73_m2] Final     Comment:     Non  GFR Calc  Starting 12/18/2018, serum creatinine based estimated GFR (eGFR) will be   calculated using the Chronic Kidney Disease Epidemiology Collaboration   (CKD-EPI) equation.       GFR Estimate If Black   Date Value Ref Range Status   12/13/2019 >90 >60 mL/min/[1.73_m2] Final     Comment:      GFR Calc  Starting 12/18/2018, serum creatinine based estimated GFR (eGFR) will be   calculated using the Chronic Kidney Disease Epidemiology Collaboration   (CKD-EPI) equation.       Lab Results   Component Value Date    CR 0.85 12/13/2019     No results found for: MICROALBUMIN    Healthy Eating  Healthy Eating Assessed Today: Yes  Cultural/Zoroastrian diet restrictions?: No  Meal planning: None, Avoiding sweets, Smaller portions  Meals include: Dinner  Breakfast: skips  Lunch: last 3 months has not  Dinner: ribeye and lobster last night, Wed. night spaghetti and meat loaf   Beverages: Water, Tea, Coffee, Alcohol  Has patient met with a dietitian in the past?: No    Being Active  Barrier to exercise: Time, Access, Other    Monitoring  Monitoring Assessed Today: Yes  Did patient bring glucose meter to appointment? : No  Blood Glucose Meter: Unknown, Accu-check(305 fasting)  Home Glucose (Sugar) Monitoring: Never    Just trained on checking her BG on the Accuchek, she was 305 here in clinic    Taking Medications  Diabetes Medication(s)      Biguanides       metFORMIN (GLUCOPHAGE-XR) 500 MG 24 hr tablet    Take 1 tablet (500 mg) by mouth 2 times daily (with meals)          Taking Medication Assessed Today: Yes  Current Treatments: None, Diet    Problem Solving  Problem Solving Assessed Today: Yes  Medical alert: No  Severe weather/disaster plan for diabetes management?: No  DKA prevention plan?: No  Sick day plan for diabetes management?: (P) No    Hypoglycemia symptoms  Confusion: No  Dizziness or Light-Headedness: No  Headaches: No  Hunger: No  Mood changes: No  Nervousness/Anxiety: No  Sleepiness: No  Speech difficulty: No  Sweats: No  Tremors: No    Hypoglycemia Complications  Blackouts: No  Hospitalization: No  Nocturnal hypoglycemia: No  Required assistance: No  Required glucagon injection: No  Seizures: No    Reducing Risks  Diabetes Risks: Age over 45 years, Hypertriglyceridemia, Hyperlipidemia, Family History  CAD Risks: Family history, Obesity, Diabetes Mellitus, Post-menopausal, Dyslipidemia  Has dilated eye exam at least once a year?: No  Sees dentist every 6 months?: Yes  Sees podiatrist (foot doctor)?: No    Healthy Coping  Healthy Coping Assessed Today: Yes  Informal Support system:: Family, Friends, Significant other  Stage of change: CONTEMPLATION (Considering change and yet undecided)  Patient Activation Measure Survey Score:  YOHAN Score (Last Two) 10/27/2011   YOHAN Raw Score 41   Activation Score 63.2   YOHAN Level 3       ASSESSMENT:  Layne comes today very upset and emotional with the DX of diabetes.  She intermittently fasts to lose weight, not happy about taking any medications and eating more food.  She really wants to lose weight and has lost 11 more lbs since Aug.  She is doing well, but now has diabetes and needs to change her lifestyle.  She listened to the education, agreed to take Metformin  mg twice per day, follow the meal plan, begin exercise and take baby ASA and check her BG.  She was feeling better when hearing  more about diabetes and facts.  Will have her meet our RD/CDE for weight loss and meal planning.  She was open to that.     Goals Addressed as of 1/2/2020 at 12:15 PM       Being Active (pt-stated)     Added 1/2/20 by Katarina Cheema RN     My Goal: I will increase my activity    What I need to meet my goal: 1. Begin walking at the mall, 2 days a week for 30 minutes each time  2. Slowly increase each week    I plan to meet my goal by this date: 1 month          Healthy Eating (pt-stated)     Added 1/2/20 by Katarina Cheema, RN     My Goal: I will follow the meal plan.    What I need to meet my goal: 1. 3 meals per day, 30-45 gms each meal of carbs with protiens  2. 2-3 snacks per day    I plan to meet my goal by this date: 1 month          Medication 1 (pt-stated)     Added 1/2/20 by Katarina Cheema RN     My Goal: I will take the following medications    What I need to meet my goal: 1. Metformin  mg take 1 pill with breakfast and 1 pill with dinner  2. 81 mg of baby aspirin per day    I plan to meet my goal by this date: 1 month            Patient's most recent   Lab Results   Component Value Date    A1C 10.8 12/13/2019    is not meeting goal of <7.0    INTERVENTION:   Diabetes knowledge and skills assessment:     Patient is knowledgeable in diabetes management concepts related to:     Patient needs further education on the following diabetes management concepts: Healthy Eating, Being Active, Monitoring, Taking Medication and Healthy Coping    Based on learning assessment above, most appropriate setting for further diabetes education would be: Individual setting.    Education provided today on:  AADE Self-Care Behaviors:  Diabetes Pathophysiology  Healthy Eating: carbohydrate counting, consistency in amount, composition, and timing of food intake, weight reduction, heart healthy diet, eating out, portion control, plate planning method and label reading  Being Active: relationship to blood glucose and describe  appropriate activity program  Monitoring: purpose, proper technique, log and interpret results, individual blood glucose targets and frequency of monitoring  Taking Medication: action of prescribed medication, side effects of prescribed medications and when to take medications  Reducing Risks: major complications of diabetes, prevention, early diagnostic measures and treatment of complications, annual eye exam, aspirin therapy, A1C - goals, relating to blood glucose levels, how often to check and lipids levels and goals  Healthy Coping: recognize feelings about diagnosis, benefits of making appropriate lifestyle changes and utilize support systems  Patient was instructed on Accu-Chek Guide meter and was able to provide an accurate return demonstration. Patient's blood glucose reading today was 305 mg/dL.    Opportunities for ongoing education and support in diabetes-self management were discussed.    Pt verbalized understanding of concepts discussed and recommendations provided today.       Education Materials Provided:  [facility] Understanding Diabetes Booklet, Carbohydrate Counting, My Plate Planner and Accu-Chek Guide meter kit    PLAN:  See Patient Instructions for co-developed, patient-stated behavior change goals.  AVS printed and provided to patient today. See Follow-Up section for recommended follow-up.    Rhona Cheema RN/MELVIN Garcia Diabetes Educator    Time Spent: 60 minutes  Encounter Type: Individual    Any diabetes medication dose changes were made via the CDE Protocol and Collaborative Practice Agreement with the patient's primary care provider. A copy of this encounter was shared with the provider.

## 2020-01-02 NOTE — PATIENT INSTRUCTIONS
Diabetes Support Resources:  Please look at your goals for  Better management of your diabetes    Plan to meet with a diabetes RD for meal planning and weight loss, Vandana Goldsmith    Check your bg when you wake up ()  and 2 hrs after a meal (<180)     Bring blood glucose meter and logbook with you to all doctor and follow-up appointments.    Diabetes Education Telephone Visit Follow-up:    We realize your time is valuable and your health is important! We offer a convenient Telephone Visit follow up! It s a quick way to check in for a medication dose adjustment without having to come back to clinic as soon.    Telephone Visits are often covered by insurance. Please check with your insurance plan to see if this type of visit is covered. If not, the cost is less expensive than an office visit:      Up to 10 minutes (Code 90318): $30    11-20 minutes (Code 45762): $59    More than 20 minutes (Code 93971): $85    Talk with your Diabetes Educator if you want to learn more.      [facility]:  For Your Diabetes Education and Nutrition Appointments Call:  [phone]   For Diabetes Education or Nutrition Related Questions:   Phone: [phone]  E-mail: [email or web address]  Fax: [phone]   If you need a medication refill please contact your pharmacy. Please allow 3 business days for your refills to be completed.

## 2020-01-02 NOTE — PROGRESS NOTES
"Diabetes Self-Management Education & Support    Diabetes Education Self Management & Training    SUBJECTIVE/OBJECTIVE:  Presents for: Initial Assessment for new diagnosis  Accompanied by: Self  Diabetes education in the past 24mo: No  Focus of Visit: Patient Unsure  Diabetes type: Type 2  Disease course: Worsening  Diabetes management related comments/concerns: intermintant fasting  Other concerns:: None  Cultural Influences/Ethnic Background:        Diabetes Symptoms & Complications  Blurred vision: No  Fatigue: No  Neuropathy: No  Foot ulcerations: No  Polydipsia: No  Polyphagia: No  Polyuria: Yes  Visual change: Yes  Weakness: No  Weight loss: Yes  Slow healing wounds: No  Recent Infection(s): No  Symptom course: Stable  Weight trend: Decreasing steadily(is on intermittent fasting and has lost weight over the last 2 yrs.)  Autonomic neuropathy: No  CVA: No  Heart disease: No  Nephropathy: No  Peripheral neuropathy: No  Peripheral Vascular Disease: No  Retinopathy: No    Patient Problem List and Family Medical History reviewed for relevant medical history, current medical status, and diabetes risk factors.    Vitals:  There were no vitals taken for this visit.  Estimated body mass index is 27.83 kg/m  as calculated from the following:    Height as of 12/13/19: 1.727 m (5' 8\").    Weight as of 12/13/19: 83 kg (183 lb).   Last 3 BP:   BP Readings from Last 3 Encounters:   12/13/19 118/60   12/01/19 110/80   11/26/19 129/81       History   Smoking Status     Never Smoker   Smokeless Tobacco     Never Used       Labs:  Lab Results   Component Value Date    A1C 10.8 12/13/2019     Lab Results   Component Value Date     12/13/2019     Lab Results   Component Value Date     12/13/2019     HDL Cholesterol   Date Value Ref Range Status   12/13/2019 47 (L) >49 mg/dL Final   ]  GFR Estimate   Date Value Ref Range Status   12/13/2019 78 >60 mL/min/[1.73_m2] Final     Comment:     Non  " GFR Calc  Starting 12/18/2018, serum creatinine based estimated GFR (eGFR) will be   calculated using the Chronic Kidney Disease Epidemiology Collaboration   (CKD-EPI) equation.       GFR Estimate If Black   Date Value Ref Range Status   12/13/2019 >90 >60 mL/min/[1.73_m2] Final     Comment:      GFR Calc  Starting 12/18/2018, serum creatinine based estimated GFR (eGFR) will be   calculated using the Chronic Kidney Disease Epidemiology Collaboration   (CKD-EPI) equation.       Lab Results   Component Value Date    CR 0.85 12/13/2019     No results found for: MICROALBUMIN    Healthy Eating  Healthy Eating Assessed Today: Yes  Cultural/Tenriism diet restrictions?: No  Meal planning: None, Avoiding sweets, Smaller portions  Meals include: Dinner  Breakfast: skips  Lunch: last 3 months has not  Dinner: ribeye and lobster last night, Wed. night spaghetti and meat loaf   Beverages: Water, Tea, Coffee, Alcohol  Has patient met with a dietitian in the past?: No    Being Active  Barrier to exercise: Time, Access, Other    Monitoring  Monitoring Assessed Today: Yes  Did patient bring glucose meter to appointment? : No  Blood Glucose Meter: Unknown, Accu-check(305 fasting)  Home Glucose (Sugar) Monitoring: Never    Just trained on checking her BG on the Accuchek, she was 305 here in clinic    Taking Medications  Diabetes Medication(s)     Biguanides       metFORMIN (GLUCOPHAGE-XR) 500 MG 24 hr tablet    Take 1 tablet (500 mg) by mouth 2 times daily (with meals)          Taking Medication Assessed Today: Yes  Current Treatments: None, Diet    Problem Solving  Problem Solving Assessed Today: Yes  Medical alert: No  Severe weather/disaster plan for diabetes management?: No  DKA prevention plan?: No  Sick day plan for diabetes management?: (P) No    Hypoglycemia symptoms  Confusion: No  Dizziness or Light-Headedness: No  Headaches: No  Hunger: No  Mood changes: No  Nervousness/Anxiety: No  Sleepiness: No  Speech  difficulty: No  Sweats: No  Tremors: No    Hypoglycemia Complications  Blackouts: No  Hospitalization: No  Nocturnal hypoglycemia: No  Required assistance: No  Required glucagon injection: No  Seizures: No    Reducing Risks  Diabetes Risks: Age over 45 years, Hypertriglyceridemia, Hyperlipidemia, Family History  CAD Risks: Family history, Obesity, Diabetes Mellitus, Post-menopausal, Dyslipidemia  Has dilated eye exam at least once a year?: No  Sees dentist every 6 months?: Yes  Sees podiatrist (foot doctor)?: No    Healthy Coping  Healthy Coping Assessed Today: Yes  Informal Support system:: Family, Friends, Significant other  Stage of change: CONTEMPLATION (Considering change and yet undecided)  Patient Activation Measure Survey Score:  YOHAN Score (Last Two) 10/27/2011   YOHAN Raw Score 41   Activation Score 63.2   YOHAN Level 3       ASSESSMENT:  Layne comes today very upset and emotional with the DX of diabetes.  She intermittently fasts to lose weight, not happy about taking any medications and eating more food.  She really wants to lose weight and has lost 11 more lbs since Aug.  She is doing well, but now has diabetes and needs to change her lifestyle.  She listened to the education, agreed to take Metformin  mg twice per day, follow the meal plan, begin exercise and take baby ASA and check her BG.  She was feeling better when hearing more about diabetes and facts.  Will have her meet our RD/CDE for weight loss and meal planning.  She was open to that.     Goals Addressed as of 1/2/2020 at 12:15 PM       Being Active (pt-stated)     Added 1/2/20 by Katarina Cheema RN     My Goal: I will increase my activity    What I need to meet my goal: 1. Begin walking at the mall, 2 days a week for 30 minutes each time  2. Slowly increase each week    I plan to meet my goal by this date: 1 month          Healthy Eating (pt-stated)     Added 1/2/20 by Katarina Cheema RN     My Goal: I will follow the meal plan.    What I  need to meet my goal: 1. 3 meals per day, 30-45 gms each meal of carbs with protiens  2. 2-3 snacks per day    I plan to meet my goal by this date: 1 month          Medication 1 (pt-stated)     Added 1/2/20 by Katarina Cheema, RN     My Goal: I will take the following medications    What I need to meet my goal: 1. Metformin  mg take 1 pill with breakfast and 1 pill with dinner  2. 81 mg of baby aspirin per day    I plan to meet my goal by this date: 1 month            Patient's most recent   Lab Results   Component Value Date    A1C 10.8 12/13/2019    is not meeting goal of <7.0    INTERVENTION:   Diabetes knowledge and skills assessment:     Patient is knowledgeable in diabetes management concepts related to:     Patient needs further education on the following diabetes management concepts: Healthy Eating, Being Active, Monitoring, Taking Medication and Healthy Coping    Based on learning assessment above, most appropriate setting for further diabetes education would be: Individual setting.    Education provided today on:  AADE Self-Care Behaviors:  Diabetes Pathophysiology  Healthy Eating: carbohydrate counting, consistency in amount, composition, and timing of food intake, weight reduction, heart healthy diet, eating out, portion control, plate planning method and label reading  Being Active: relationship to blood glucose and describe appropriate activity program  Monitoring: purpose, proper technique, log and interpret results, individual blood glucose targets and frequency of monitoring  Taking Medication: action of prescribed medication, side effects of prescribed medications and when to take medications  Reducing Risks: major complications of diabetes, prevention, early diagnostic measures and treatment of complications, annual eye exam, aspirin therapy, A1C - goals, relating to blood glucose levels, how often to check and lipids levels and goals  Healthy Coping: recognize feelings about diagnosis,  benefits of making appropriate lifestyle changes and utilize support systems  Patient was instructed on Accu-Chek Guide meter and was able to provide an accurate return demonstration. Patient's blood glucose reading today was 305 mg/dL.    Opportunities for ongoing education and support in diabetes-self management were discussed.    Pt verbalized understanding of concepts discussed and recommendations provided today.       Education Materials Provided:  [facility] Understanding Diabetes Booklet, Carbohydrate Counting, My Plate Planner and Accu-Chek Guide meter kit    PLAN:  See Patient Instructions for co-developed, patient-stated behavior change goals.  AVS printed and provided to patient today. See Follow-Up section for recommended follow-up.    Rhona Cheema RN/MELVIN Garcia Diabetes Educator    Time Spent: 60 minutes  Encounter Type: Individual    Any diabetes medication dose changes were made via the CDE Protocol and Collaborative Practice Agreement with the patient's primary care provider. A copy of this encounter was shared with the provider.

## 2020-01-22 ENCOUNTER — TELEPHONE (OUTPATIENT)
Dept: FAMILY MEDICINE | Facility: CLINIC | Age: 55
End: 2020-01-22

## 2020-01-24 ENCOUNTER — OFFICE VISIT (OUTPATIENT)
Dept: URGENT CARE | Facility: URGENT CARE | Age: 55
End: 2020-01-24
Payer: COMMERCIAL

## 2020-01-24 VITALS
SYSTOLIC BLOOD PRESSURE: 135 MMHG | TEMPERATURE: 97.6 F | HEART RATE: 68 BPM | OXYGEN SATURATION: 99 % | DIASTOLIC BLOOD PRESSURE: 86 MMHG

## 2020-01-24 DIAGNOSIS — E11.9 TYPE 2 DIABETES MELLITUS WITHOUT COMPLICATION, WITHOUT LONG-TERM CURRENT USE OF INSULIN (H): ICD-10-CM

## 2020-01-24 DIAGNOSIS — R30.0 DYSURIA: Primary | ICD-10-CM

## 2020-01-24 DIAGNOSIS — N76.0 VAGINITIS AND VULVOVAGINITIS: ICD-10-CM

## 2020-01-24 LAB
ALBUMIN UR-MCNC: NEGATIVE MG/DL
APPEARANCE UR: CLEAR
BACTERIA #/AREA URNS HPF: ABNORMAL /HPF
BILIRUB UR QL STRIP: NEGATIVE
COLOR UR AUTO: YELLOW
GLUCOSE UR STRIP-MCNC: NEGATIVE MG/DL
HGB UR QL STRIP: ABNORMAL
KETONES UR STRIP-MCNC: NEGATIVE MG/DL
LEUKOCYTE ESTERASE UR QL STRIP: ABNORMAL
NITRATE UR QL: NEGATIVE
NON-SQ EPI CELLS #/AREA URNS LPF: ABNORMAL /LPF
PH UR STRIP: 6 PH (ref 5–7)
RBC #/AREA URNS AUTO: ABNORMAL /HPF
SOURCE: ABNORMAL
SP GR UR STRIP: <=1.005 (ref 1–1.03)
SPECIMEN SOURCE: NORMAL
UROBILINOGEN UR STRIP-ACNC: 0.2 EU/DL (ref 0.2–1)
WBC #/AREA URNS AUTO: ABNORMAL /HPF
WET PREP SPEC: NORMAL

## 2020-01-24 PROCEDURE — 87210 SMEAR WET MOUNT SALINE/INK: CPT | Performed by: FAMILY MEDICINE

## 2020-01-24 PROCEDURE — 99214 OFFICE O/P EST MOD 30 MIN: CPT | Performed by: FAMILY MEDICINE

## 2020-01-24 PROCEDURE — 81001 URINALYSIS AUTO W/SCOPE: CPT | Performed by: FAMILY MEDICINE

## 2020-01-24 RX ORDER — FLUCONAZOLE 150 MG/1
150 TABLET ORAL ONCE
Qty: 1 TABLET | Refills: 0 | Status: SHIPPED | OUTPATIENT
Start: 2020-01-24 | End: 2020-01-24

## 2020-01-25 NOTE — PATIENT INSTRUCTIONS
Patient Education     Pelvic Organ Prolapse  Pelvic organ prolapse is when 1 or more organs inside the pelvis slip from their normal places. The pelvis is found between the waist and thighs. Normally, muscles and tissues in the pelvic region support the pelvic organs and hold them in place.  What is a normal pelvis?     Cutaway view of pelvis showing the small intestine, bladder, pubic bone, urethra, pelvic floor muscles, uterus, vagina, and rectum.   A. The small intestine absorbs nutrients from food.  B. The bladder collects and holds urine.  C. The pubic bone helps protect the pelvic organs.  D. The urethra is the tube that carries urine out of the body.  E. The pelvic floor muscles support organs and other structures in the pelvis.  F. The uterus is where the baby develops when a women is pregnant.  G. The vagina is the canal from the uterus to the outside of the body.  H. The rectum stores stool until a bowel movement occurs.  What causes pelvic organ prolapse?   There are several causes of pelvic organ prolapse including:    Vaginal childbirth    Hereditary (genetic) factors    Connective tissue disorders    Getting older    Constant coughing (such as with bronchitis or smoking)    Heavy lifting    Chronic straining (such as with constipation)    Being overweight  What are the symptoms of pelvic organ prolapse?  The symptoms of pelvic organ prolapse include:    A feeling of fullness or pressure in your pelvis    A sense that a ball or lump is sticking out from the vagina    Problems passing urine or having a bowel movement    Urine leakage when you cough or use stairs. (But this can happen even without prolapse.)     Pain or pressure in your low back    Pain when having sex  Date Last Reviewed: 6/1/2017 2000-2019 [employer]. [address] [place] [address], [place] [address]. All rights reserved. This information is not intended as a substitute for professional medical care. Always follow your  healthcare professional's instructions.

## 2020-01-25 NOTE — PROGRESS NOTES
Chief complaint: dysuria    Yesterday started having burning and slight itching  Today has been having dysuria and urgency   No fevers no flank pain   No nausea vomiting or diarrhea   Feels some pressure and irritation   Foul-smelling or fishy odor: NO  Cheese like discharge: NO  Worsening: NO  Itchy: slight  Tried over the counter medications: NO  no relief  Pelvic pain: No  Concern about STD exposure, unprotected sex, or high risk sexual behavior: No    Problem list and histories reviewed & adjusted, as indicated.  Additional history: as documented    Problem list, Medication list, Allergies, and Medical/Social/Surgical histories reviewed in [facility] and updated as appropriate.    ROS:  Constitutional, HEENT, cardiovascular, pulmonary, gi and gu systems are negative, except as otherwise noted.    OBJECTIVE:                                                    /86   Pulse 68   Temp 97.6  F (36.4  C) (Tympanic)   SpO2 99%   There is no height or weight on file to calculate BMI.  GENERAL: healthy, alert and no distress  EYES: Eyes grossly normal to inspection,  ABDOMEN: soft, nontender, no hepatosplenomegaly, no masses and bowel sounds normal   (female): female external genitalia appeared erythematous and dry  Mild cystocoele and rectocoele, normal urethral meatus, vaginal mucosa, normal cervix/adnexa/uterus without masses, No cervical motion tenderness  Vaginal discharge appeared: whities   MS: no gross musculoskeletal defects noted, no edema  SKIN: no suspicious lesions or rashes  NEURO: Normal strength and tone, mentation intact and speech normal  PSYCH: mentation appears normal, affect normal/bright    Diagnostic Test Results:  Results for orders placed or performed in visit on 01/24/20 (from the past 24 hour(s))   *UA reflex to Microscopic and Culture ([place] and [facility] (except [place] and [place])   Result Value Ref Range    Color Urine Yellow     Appearance Urine Clear     Glucose Urine  Negative NEG^Negative mg/dL    Bilirubin Urine Negative NEG^Negative    Ketones Urine Negative NEG^Negative mg/dL    Specific Gravity Urine <=1.005 1.003 - 1.035    Blood Urine Trace (A) NEG^Negative    pH Urine 6.0 5.0 - 7.0 pH    Protein Albumin Urine Negative NEG^Negative mg/dL    Urobilinogen Urine 0.2 0.2 - 1.0 EU/dL    Nitrite Urine Negative NEG^Negative    Leukocyte Esterase Urine Trace (A) NEG^Negative    Source Midstream Urine    Urine Microscopic   Result Value Ref Range    WBC Urine 0 - 5 OTO5^0 - 5 /HPF    RBC Urine O - 2 OTO2^O - 2 /HPF    Squamous Epithelial /LPF Urine Few FEW^Few /LPF    Bacteria Urine Few (A) NEG^Negative /HPF   Wet prep   Result Value Ref Range    Specimen Description Vagina     Wet Prep Rare  WBC'S seen       Wet Prep No Trichomonas seen     Wet Prep No yeast seen     Wet Prep No clue cells seen         ASSESSMENT/PLAN:                                                        ICD-10-CM    1. Dysuria R30.0 *UA reflex to Microscopic and Culture ([place] and [facility] (except [place] and [place])     Urine Microscopic     Wet prep   2. Vaginitis and vulvovaginitis N76.0 fluconazole (DIFLUCAN) 150 MG tablet     OB/GYN REFERRAL   3. Type 2 diabetes mellitus without complication, without long-term current use of insulin (H) E11.9      Wet prep negative but then external genital exam red dry and possible yeast.   One dose diflucan   Recommend obgyn referral- mild prolapse noted. Patient having symptoms.   Recommend follow up with primary care provider or obgyn if no relief, sooner if worse  Adverse reactions of medications discussed.  Over the counter medications discussed.   Aware to come back in if with worsening symptoms or if no relief despite treatment plan  Patient voiced understanding and had no further questions.   Follow up with primary care provider for DM per patient doing well   Estrellita Howe MD    See Patient Instructions    Estrellita Howe  MD  [facility]

## 2020-02-24 ENCOUNTER — HEALTH MAINTENANCE LETTER (OUTPATIENT)
Age: 55
End: 2020-02-24

## 2020-08-10 ENCOUNTER — NURSE TRIAGE (OUTPATIENT)
Dept: NURSING | Facility: CLINIC | Age: 55
End: 2020-08-10

## 2020-08-10 NOTE — TELEPHONE ENCOUNTER
"Pt states \"Went to [place] for a long weekend.\"  \"Now my employer won't let me come back to work without a covid test.\"  No symptoms of illness whatsoever.  Pt states \"I would not even consider myself as 'exposed'.\"  Pt reports the event was well orchestrated.    Offered virtual telephone provider visit to request order for PCR Covid testing.  However pt states \"I'll just see if I can find a rapid test somewhere else first.\"  Will call back if changes mind and wishes a telephone visit appointment.  No further action required for this encounter.    Magdalena PORTILLO Health Nurse Advisor     Reason for Disposition    [1] COVID-19 EXPOSURE (Close Contact) within last 14 days AND [2] needs COVID-19 lab test to return to work AND [3] NO symptoms    Additional Information    Negative: COVID-19 has been diagnosed by a healthcare provider (HCP)    Negative: COVID-19 lab test positive    Negative: [1] Symptoms of COVID-19 (e.g., cough, fever, SOB, or others) AND [2] lives in an area with community spread    Negative: [1] Symptoms of COVID-19 (e.g., cough, fever, SOB, or others) AND [2] within 14 days of EXPOSURE (close contact) with diagnosed or suspected COVID-19 patient    Negative: [1] Symptoms of COVID-19 (e.g., cough, fever, SOB, or others) AND [2] within 14 days of travel from high-risk area for COVID-19 community spread (identified by CDC)    Negative: [1] Difficulty breathing (shortness of breath) occurs AND [2] onset > 14 days after COVID-19 EXPOSURE (Close Contact) AND [3] no community spread where patient lives    Negative: [1] Dry cough occurs AND [2] onset > 14 days after COVID-19 EXPOSURE AND [3] no community spread where patient lives    Negative: [1] Wet cough (i.e., white-yellow, yellow, green, or livia colored sputum) AND [2] onset > 14 days after COVID-19 EXPOSURE AND [3] no community spread where patient lives    Negative: [1] Common cold symptoms AND [2] onset > 14 days after COVID-19 EXPOSURE AND [3] no " community spread where patient lives    [facility] Specific Disposition  - [facility] Specific Patient Instructions  COVID 19 Nurse Triage Plan/Patient Instructions    Please be aware that novel coronavirus (COVID-19) may be circulating in the community. If you develop symptoms such as fever, cough, or SOB or if you have concerns about the presence of another infection including coronavirus (COVID-19), please contact your health care provider or visit [email or web address].       Additional COVID19 information to add for patients.   How can I protect others?  If you have symptoms (fever, cough, body aches or trouble breathing): Stay home and away from others (self-isolate) until:  At least 10 days have passed since your symptoms started. And   You ve had no fever--and no medicine that reduces fever--for 3 full days (72 hours). And    Your other symptoms have resolved (gotten better).     If you don t have symptoms, but a test showed that you have COVID-19 (you tested positive):  Stay home and away from others (self-isolate) until at least 10 days have passed since the date of your first positive COVID-19 test.    During this time:  Stay in your own room, even for meals. Use your own bathroom if you can.   Stay away from others in your home. No hugging, kissing or shaking hands. No visitors.  Don t go to work, school or anywhere else.   Clean  high touch  surfaces often (doorknobs, counters, handles, etc.). Use a household cleaning spray or wipes. You ll find a full list on the EPA website:  www.epa.gov/pesticide-registration/list-n-disinfectants-use-against-sars-cov-2.  Cover your mouth and nose with a mask, tissue or washcloth to avoid spreading germs.  Wash your hands and face often. Use soap and water.  Caregivers in these groups are at risk for severe illness due to COVID-19:  People 65 years and older  People who live in a nursing home or long-term care facility  People with chronic disease (lung, heart,  cancer, diabetes, kidney, liver, immunologic)  People who have a weakened immune system, including those who:  Are in cancer treatment  Take medicine that weakens the immune system, such as corticosteroids  Had a bone marrow or organ transplant  Have an immune deficiency  Have poorly controlled HIV or AIDS  Are obese (body mass index of 40 or higher)  Smoke regularly  Caregivers should wear gloves while washing dishes, handling laundry and cleaning bedrooms and bathrooms.  Use caution when washing and drying laundry: Don t shake dirty laundry, and use the warmest water setting that you can.  For more tips, go to www.cdc.gov/coronavirus/2019-ncov/downloads/10Things.pdf.    How can I take care of myself?  Get lots of rest. Drink extra fluids (unless a doctor has told you not to).     Take Tylenol (acetaminophen) for fever or pain. If you have liver or kidney problems, ask your family doctor if it s okay to take Tylenol.     Adults can take either:   650 mg (two 325 mg pills) every 4 to 6 hours, or   1,000 mg (two 500 mg pills) every 8 hours as needed.   Note: Don t take more than 3,000 mg in one day.   Acetaminophen is found in many medicines (both prescribed and over-the-counter medicines). Read all labels to be sure you don t take too much.     For children, check the Tylenol bottle for the right dose. The dose is based on the child s age or weight.    If you have other health problems (like cancer, heart failure, an organ transplant or severe kidney disease): Call your specialty clinic if you don t feel better in the next 2 days.    Know when to call 911: Emergency warning signs include:  Trouble breathing or shortness of breath  Pain or pressure in the chest that doesn t go away  Feeling confused like you haven t felt before, or not being able to wake up  Bluish-colored lips or face    What are the symptoms of COVID-19?   The most common symptoms are cough, fever and trouble breathing.   Less common symptoms  include body aches, chills, diarrhea (loose, watery poops), fatigue (feeling very tired), headache, runny nose, sore throat and loss of smell.  COVID-19 can cause severe coughing (bronchitis) and lung infection (pneumonia).    How does it spread?   The virus may spread when a person coughs or sneezes into the air. The virus can travel about 6 feet this way, and it can live on surfaces.    Common  (household disinfectants) will kill the virus.    Who is at risk?  Anyone can catch COVID-19 if they re around someone who has the virus.    How can others protect themselves?   Stay away from people who have COVID-19 (or symptoms of COVID-19).  Wash hands often with soap and water. Or, use hand  with at least 60% alcohol.  Avoid touching the eyes, nose or mouth.   Wear a face mask when you go out in public, when sick or when caring for a sick person.    Where can I get more information?  [facility]: About COVID-19: [email or web address]  CDC: What to Do If You re Sick: www.cdc.gov/coronavirus/2019-ncov/about/steps-when-sick.html  CDC: Ending Home Isolation: www.cdc.gov/coronavirus/2019-ncov/hcp/disposition-in-home-patients.html   CDC: Caring for Someone: www.cdc.gov/coronavirus/2019-ncov/if-you-are-sick/care-for-someone.html   [facility]: Interim Guidance for Hospital Discharge to Home: www.health.[facility].mn.us/diseases/coronavirus/hcp/hospdischarge.pdf  [facility] clinical trials (COVID-19 research studies): clinicalaffairs.[facility]/um-clinical-trials   Below are the COVID-19 hotlines at the [place] Department of Health ([facility]). Interpreters are available.   For health questions: Call 640-980-6468 or 1-776.968.7851 (7 a.m. to 7 p.m.)  For questions about schools and childcare: Call 801-911-4323 or 1-200.984.3065 (7 a.m. to 7 p.m.)        Thank you for taking steps to prevent the spread of this virus.  Limit your contact with others.  Wear a simple mask to cover your cough.  Wash your  hands well and often.    Resources  [facility]: About COVID-19: [email or web address]  CDC: What to Do If You're Sick: www.cdc.gov/coronavirus/2019-ncov/about/steps-when-sick.html  CDC: Ending Home Isolation: www.cdc.gov/coronavirus/2019-ncov/hcp/disposition-in-home-patients.html   CDC: Caring for Someone: www.cdc.gov/coronavirus/2019-ncov/if-you-are-sick/care-for-someone.html   [facility]: Interim Guidance for Hospital Discharge to Home: www.[facility].mn./diseases/coronavirus/hcp/hospdischarge.pdf  [facility] clinical trials (COVID-19 research studies): clinicalaffairs.[facility]-clinical-trials   Below are the COVID-19 hotlines at the [place] Department of [facility] ([facility]). Interpreters are available.   For health questions: Call 198-681-9790 or 1-196.116.3435 (7 a.m. to 7 p.m.)  For questions about schools and childcare: Call 696-186-3840 or 1-912.707.8409 (7 a.m. to 7 p.m.)    Protocols used: CORONAVIRUS (COVID-19) EXPOSURE-A-AH 5.16.20

## 2020-12-13 ENCOUNTER — HEALTH MAINTENANCE LETTER (OUTPATIENT)
Age: 55
End: 2020-12-13

## 2020-12-21 ENCOUNTER — NURSE TRIAGE (OUTPATIENT)
Dept: NURSING | Facility: CLINIC | Age: 55
End: 2020-12-21

## 2020-12-21 NOTE — TELEPHONE ENCOUNTER
"RN Triage:      Spoke with 55 yr old Layne who c/o:    Excruciating HA the past 3 days, on the top and towards the back of the head.    Has tried Ibuprofen at night, but stopped taking this for 2 days because she feels it's not good for her.    Sleeps okay, but wakes up with a HA each morning.    Rates HA pain an 8 on a 0-10 pain scale; reports Ibuprofen didn't help this much.    Upset stomach/doesn't feel like eating.    Feels like she is \"hung over\"    .  Diabetes type 2, takes Metformin.    Pt is also concerned about possible COVID-19.    PLAN:  Will consult with PCP regarding level of care/ED or office visit with PCP approval.  Please advise.  Advised pt to rest and apply cool pack.  Advised to call back if symptoms are worsening.      Laura Townsend RN  [facility]            Additional Information    Negative: Difficult to awaken or acting confused (e.g., disoriented, slurred speech)    Negative: Weakness of the face, arm or leg on one side of the body and new onset    Negative: Numbness of the face, arm or leg on one side of the body and new onset    Negative: Loss of speech or garbled speech and new onset    Negative: Passed out (i.e., fainted, collapsed and was not responding)    Negative: Sounds like a life-threatening emergency to the triager    Negative: Followed a head injury within last 3 days    Negative: Traumatic Brain Injury (TBI) is suspected    Negative: Sinus pain of forehead and yellow or green nasal discharge    Negative: Pregnant    Negative: Unable to walk without falling    Negative: Stiff neck (can't touch chin to chest)    Negative: Possibility of carbon monoxide exposure    SEVERE headache, states 'worst headache' of life    Negative: Unconscious or difficult to awaken    Negative: Acting confused (e.g., disoriented, slurred speech)    Negative: Very weak (can't stand)    Negative: Sounds like a life-threatening emergency to the triager    Negative: Vomiting and signs of " dehydration (e.g., very dry mouth, lightheaded, dark urine)    Negative: Blood glucose > 240 mg/dL (13.3 mmol/L) and rapid breathing    Negative: Blood glucose > 500 mg/dL (27.8 mmol/L)    Negative: Blood glucose > 240 mg/dL (13.3 mmol/L) AND urine ketones moderate-large (or more than 1+)    Negative: Blood glucose > 240 mg/dL (13.3 mmol/L) and blood ketones > 1.4 mmol/L    Negative: Blood glucose > 240 mg/dL (13.3 mmol/L) AND vomiting AND unable to check for ketones (in blood or urine)    Negative: Vomiting lasting > 4 hours    Negative: Patient sounds very sick or weak to the triager    Negative: Fever > 100.5 F (38.1 C)    Negative: Caller has URGENT medication or insulin pump question and triager unable to answer question    Negative: Blood glucose > 400 mg/dL (22.2 mmol/L)    Negative: Blood glucose > 300 mg/dL (16.7 mmol/L) AND two or more times in a row    Negative: Urine ketones moderate - large (or blood ketones > 1.4 mmol/L)    Negative: New-onset diabetes suspected (e.g., frequent urination, weak, weight loss)    Negative: Symptoms of high blood sugar (e.g., frequent urination, weak, weight loss) and not able to test blood glucose    Negative: Patient wants to be seen    Negative: Caller has NON-URGENT medication question about med that PCP prescribed and triager unable to answer question    Blood glucose 240 - 300 mg/dL (13.3 - 16.7 mmol/L)    Protocols used: HEADACHE-A-OH, DIABETES - HIGH BLOOD SUGAR-A-OH

## 2020-12-21 NOTE — TELEPHONE ENCOUNTER
Huddled with Dr Franci Reyes and gave her information from the RN's triage notes below.  She wants patient to go to ER now. She may need to be imaged.     Called patient and informed her to go to ER.  She will call someone and have them drive her.     Routing to Dr Franci Reyes to co-sign this RN's documentation.        Meagan DELGADILLON, RN

## 2021-02-25 ENCOUNTER — ALLIED HEALTH/NURSE VISIT (OUTPATIENT)
Dept: EDUCATION SERVICES | Facility: CLINIC | Age: 56
End: 2021-02-25
Payer: COMMERCIAL

## 2021-02-25 DIAGNOSIS — E11.9 DIABETES MELLITUS WITHOUT COMPLICATION (H): Primary | ICD-10-CM

## 2021-02-25 DIAGNOSIS — E11.9 DIABETES MELLITUS, TYPE 2 (H): ICD-10-CM

## 2021-02-25 PROCEDURE — G0108 DIAB MANAGE TRN  PER INDIV: HCPCS

## 2021-02-25 PROCEDURE — 99207 PR DROP WITH A PROCEDURE: CPT

## 2021-02-25 RX ORDER — METFORMIN HCL 500 MG
1000 TABLET, EXTENDED RELEASE 24 HR ORAL 2 TIMES DAILY WITH MEALS
Qty: 360 TABLET | Refills: 3 | Status: SHIPPED | OUTPATIENT
Start: 2021-02-25 | End: 2021-04-22

## 2021-02-25 NOTE — PATIENT INSTRUCTIONS
Diabetes Support Resources:  1. Metformin  mg take 2 pills with breakfast and 2 pills with dinner    2. Please eat something every 4-5 hrs during the daytime.    Having 3 carb choices and proteins with meals, do not skip.     3. 2-3 snacks during the daytime, mid morning and mid afternoon, could have fruit and cottage cheese, cheese and crackers, peanut butter snacks, wheat bread or whole grain, apples and peanut butter    4.  Check BG when you wake up.  ()    5. Check 2 hrs after dinner (<180)     Bring blood glucose meter and logbook with you to all doctor and follow-up appointments.    Diabetes Education Telephone Visit Follow-up:    We realize your time is valuable and your health is important! We offer a convenient Telephone Visit follow up! It s a quick way to check in for a medication dose adjustment without having to come back to clinic as soon.    Telephone Visits are often covered by insurance. Please check with your insurance plan to see if this type of visit is covered. If not, the cost is less expensive than an office visit:      Up to 10 minutes (Code 50466): $30    11-20 minutes (Code 74094): $59    More than 20 minutes (Code 40041): $85    Talk with your Diabetes Educator if you want to learn more.      [facility]:  For Your Diabetes Education and Nutrition Appointments Call:  [phone]   For Diabetes Education or Nutrition Related Questions:   Phone: [phone]  Send Vyu Message   If you need a medication refill please contact your pharmacy. Please allow 3 business days for your refills to be completed.

## 2021-02-25 NOTE — PROGRESS NOTES
"Diabetes Self-Management Education & Support    Presents for: Follow-up    SUBJECTIVE/OBJECTIVE:  Presents for: Follow-up  Accompanied by: Self  Diabetes education in the past 24mo: No  Focus of Visit: Patient Unsure  Diabetes type: Type 2  Disease course: Improving  Diabetes management related comments/concerns: intermintant fasting  Transportation concerns: No  Difficulty affording diabetes medication?: No  Difficulty affording diabetes testing supplies?: No  Other concerns:: None  Cultural Influences/Ethnic Background:        Diabetes Symptoms & Complications:  Fatigue: No  Neuropathy: No  Polydipsia: No  Polyphagia: No  Polyuria: Yes  Visual change: Yes  Slow healing wounds: No  Symptom course: Improving  Weight trend: Decreasing  Complications assessed today?: Yes  Autonomic neuropathy: No  CVA: No  Heart disease: No  Nephropathy: No  Peripheral neuropathy: No  Foot ulcerations: No  Peripheral Vascular Disease: No  Retinopathy: No    Patient Problem List and Family Medical History reviewed for relevant medical history, current medical status, and diabetes risk factors.    Vitals:  There were no vitals taken for this visit.  Estimated body mass index is 27.83 kg/m  as calculated from the following:    Height as of 12/13/19: 1.727 m (5' 8\").    Weight as of 12/13/19: 83 kg (183 lb).   Last 3 BP:   BP Readings from Last 3 Encounters:   01/24/20 135/86   12/13/19 118/60   12/01/19 110/80       History   Smoking Status     Never Smoker   Smokeless Tobacco     Never Used       Labs:  Lab Results   Component Value Date    A1C 10.8 12/13/2019     Lab Results   Component Value Date     12/13/2019     Lab Results   Component Value Date     12/13/2019     HDL Cholesterol   Date Value Ref Range Status   12/13/2019 47 (L) >49 mg/dL Final   ]  GFR Estimate   Date Value Ref Range Status   12/13/2019 78 >60 mL/min/[1.73_m2] Final     Comment:     Non  GFR Calc  Starting 12/18/2018, serum " creatinine based estimated GFR (eGFR) will be   calculated using the Chronic Kidney Disease Epidemiology Collaboration   (CKD-EPI) equation.       GFR Estimate If Black   Date Value Ref Range Status   12/13/2019 >90 >60 mL/min/[1.73_m2] Final     Comment:      GFR Calc  Starting 12/18/2018, serum creatinine based estimated GFR (eGFR) will be   calculated using the Chronic Kidney Disease Epidemiology Collaboration   (CKD-EPI) equation.       Lab Results   Component Value Date    CR 0.85 12/13/2019     No results found for: MICROALBUMIN    Healthy Eating:  Healthy Eating Assessed Today: Yes  Cultural/Alevism diet restrictions?: No  Meal planning/habits: None, Avoiding sweets, Smaller portions  Meals include: Dinner  Breakfast: skips, had coffee today.  will get an egg mcmuffin  Lunch: banana and some yogurt  Dinner: ribeye and lobster last night, Wed. night spaghetti and meat loaf   Beverages: Water, Tea, Coffee, Alcohol  Has patient met with a dietitian in the past?: No    Being Active:  Barrier to exercise: Time, Access, Other    Monitoring:  Monitoring Assessed Today: Yes  Did patient bring glucose meter to appointment? : No  Blood Glucose Meter: Accu-chek  Times checking blood sugar at home (number): 2  Times checking blood sugar at home (per): Day  Blood glucose trend: Decreasing                Taking Medications:  Diabetes Medication(s)     Biguanides       metFORMIN (GLUCOPHAGE-XR) 500 MG 24 hr tablet    Take 2 tablets (1,000 mg) by mouth 2 times daily (with meals)          Taking Medication Assessed Today: Yes  Current Treatments: None, Diet  Problems taking diabetes medications regularly?: Yes  Diabetes medication side effects?: No    Problem Solving:  Problem Solving Assessed Today: Yes  Medical ID: No  Does patient have DKA prevention plan?: No  Does patient have severe weather/disaster plan for diabetes management?: No    Hypoglycemia symptoms  Confusion: No  Dizziness or  Light-Headedness: No  Headaches: No  Hunger: No  Mood changes: No  Nervousness/Anxiety: No  Sleepiness: No  Speech difficulty: No  Sweats: No  Feeling shaky: No    Hypoglycemia Complications  Blackouts: No  Hospitalization: No  Nocturnal hypoglycemia: No  Required assistance: No  Required glucagon injection: No  Seizures: No    Reducing Risks:  Diabetes Risks: Age over 45 years, Hypertriglyceridemia, Hyperlipidemia, Family History  CAD Risks: Family history, Obesity, Diabetes Mellitus, Post-menopausal, Dyslipidemia  Has dilated eye exam at least once a year?: No  Sees dentist every 6 months?: Yes  Feet checked by healthcare provider in the last year?: No    Healthy Coping:  Healthy Coping Assessed Today: Yes  Informal Support system:: Family, Friends, Significant other  Stage of change: ACTION (Actively working towards change)  Patient Activation Measure Survey Score:  YOHAN Score (Last Two) 10/27/2011   YOHAN Raw Score 41   Activation Score 63.2   YOHAN Level 3       Diabetes knowledge and skills assessment:   Patient is knowledgeable in diabetes management concepts related to: Monitoring    Patient needs further education on the following diabetes management concepts: Healthy Eating, Being Active, Monitoring and Taking Medication    Based on learning assessment above, most appropriate setting for further diabetes education would be: Individual setting.      INTERVENTIONS:    Education provided today on:  AADE Self-Care Behaviors:  Diabetes Pathophysiology  Healthy Eating: carbohydrate counting, consistency in amount, composition, and timing of food intake, weight reduction, heart healthy diet, eating out, portion control, plate planning method and label reading  Being Active: relationship to blood glucose and describe appropriate activity program  Monitoring: purpose, proper technique, log and interpret results, individual blood glucose targets and frequency of monitoring  Taking Medication: action of prescribed  medication, side effects of prescribed medications and when to take medications    Opportunities for ongoing education and support in diabetes-self management were discussed.    Pt verbalized understanding of concepts discussed and recommendations provided today.       Education Materials Provided:  [facility] Understanding Diabetes Booklet, Living Healthy with Diabetes, Carbohydrate Counting and My Plate Planner      ASSESSMENT:  Layne's biggest challenge is to eat following the meal plan.  She admits she skips meals and we had the appointment focused around this.          Patient's most recent   Lab Results   Component Value Date    A1C 10.8 12/13/2019    is not meeting goal of <7.0    PLAN  See Patient Instructions for co-developed, patient-stated behavior change goals.  AVS printed and provided to patient today. See Follow-Up section for recommended follow-up.    Rhona Cheema RN/MELVIN Garcia Diabetes Educator    Time Spent: 60 minutes  Encounter Type: Individual    Any diabetes medication dose changes were made via the [facility] Protocol and Collaborative Practice Agreement with the patient's primary care provider. A copy of this encounter was shared with the provider.

## 2021-03-12 ENCOUNTER — MYC MEDICAL ADVICE (OUTPATIENT)
Dept: OTHER | Age: 56
End: 2021-03-12

## 2021-03-15 NOTE — TELEPHONE ENCOUNTER
Lav[employer] message sent and follow-up appt rescheduled per request.    Wanda Ventura RN, [facility] [place] [facility] [place]

## 2021-04-17 ENCOUNTER — HEALTH MAINTENANCE LETTER (OUTPATIENT)
Age: 56
End: 2021-04-17

## 2021-04-22 ENCOUNTER — OFFICE VISIT (OUTPATIENT)
Dept: FAMILY MEDICINE | Facility: CLINIC | Age: 56
End: 2021-04-22
Payer: COMMERCIAL

## 2021-04-22 VITALS
WEIGHT: 177 LBS | HEART RATE: 78 BPM | HEIGHT: 68 IN | SYSTOLIC BLOOD PRESSURE: 126 MMHG | TEMPERATURE: 97.6 F | OXYGEN SATURATION: 100 % | BODY MASS INDEX: 26.83 KG/M2 | DIASTOLIC BLOOD PRESSURE: 82 MMHG

## 2021-04-22 DIAGNOSIS — R59.0 LEFT CERVICAL LYMPHADENOPATHY: ICD-10-CM

## 2021-04-22 DIAGNOSIS — Z12.4 SCREENING FOR MALIGNANT NEOPLASM OF CERVIX: ICD-10-CM

## 2021-04-22 DIAGNOSIS — Z11.59 NEED FOR HEPATITIS C SCREENING TEST: ICD-10-CM

## 2021-04-22 DIAGNOSIS — L65.9 HAIR LOSS: ICD-10-CM

## 2021-04-22 DIAGNOSIS — Z12.31 ENCOUNTER FOR SCREENING MAMMOGRAM FOR BREAST CANCER: ICD-10-CM

## 2021-04-22 DIAGNOSIS — E11.65 UNCONTROLLED TYPE 2 DIABETES MELLITUS WITH HYPERGLYCEMIA (H): ICD-10-CM

## 2021-04-22 DIAGNOSIS — E78.5 DYSLIPIDEMIA: ICD-10-CM

## 2021-04-22 DIAGNOSIS — Z00.00 ROUTINE GENERAL MEDICAL EXAMINATION AT A HEALTH CARE FACILITY: Primary | ICD-10-CM

## 2021-04-22 DIAGNOSIS — Z11.4 SCREENING FOR HIV (HUMAN IMMUNODEFICIENCY VIRUS): ICD-10-CM

## 2021-04-22 PROBLEM — Z86.718 PERSONAL HISTORY OF DVT (DEEP VEIN THROMBOSIS): Status: ACTIVE | Noted: 2021-04-22

## 2021-04-22 LAB
ALBUMIN SERPL-MCNC: 4 G/DL (ref 3.4–5)
ALP SERPL-CCNC: 109 U/L (ref 40–150)
ALT SERPL W P-5'-P-CCNC: 38 U/L (ref 0–50)
ANION GAP SERPL CALCULATED.3IONS-SCNC: 6 MMOL/L (ref 3–14)
AST SERPL W P-5'-P-CCNC: 32 U/L (ref 0–45)
BASOPHILS # BLD AUTO: 0 10E9/L (ref 0–0.2)
BASOPHILS NFR BLD AUTO: 0.6 %
BILIRUB SERPL-MCNC: 0.3 MG/DL (ref 0.2–1.3)
BUN SERPL-MCNC: 14 MG/DL (ref 7–30)
CALCIUM SERPL-MCNC: 9.6 MG/DL (ref 8.5–10.1)
CHLORIDE SERPL-SCNC: 101 MMOL/L (ref 94–109)
CHOLEST SERPL-MCNC: 320 MG/DL
CO2 SERPL-SCNC: 27 MMOL/L (ref 20–32)
CREAT SERPL-MCNC: 0.87 MG/DL (ref 0.52–1.04)
CREAT UR-MCNC: 113 MG/DL
DIFFERENTIAL METHOD BLD: NORMAL
EOSINOPHIL # BLD AUTO: 0.2 10E9/L (ref 0–0.7)
EOSINOPHIL NFR BLD AUTO: 4.2 %
ERYTHROCYTE [DISTWIDTH] IN BLOOD BY AUTOMATED COUNT: 12.3 % (ref 10–15)
FERRITIN SERPL-MCNC: 141 NG/ML (ref 8–252)
GFR SERPL CREATININE-BSD FRML MDRD: 76 ML/MIN/{1.73_M2}
GLUCOSE SERPL-MCNC: 234 MG/DL (ref 70–99)
HBA1C MFR BLD: 9.3 % (ref 0–5.6)
HCT VFR BLD AUTO: 44.6 % (ref 35–47)
HDLC SERPL-MCNC: 55 MG/DL
HGB BLD-MCNC: 15.1 G/DL (ref 11.7–15.7)
HIV 1+2 AB+HIV1 P24 AG SERPL QL IA: NONREACTIVE
IRON SATN MFR SERPL: 17 % (ref 15–46)
IRON SERPL-MCNC: 72 UG/DL (ref 35–180)
LDLC SERPL CALC-MCNC: 195 MG/DL
LYMPHOCYTES # BLD AUTO: 2.5 10E9/L (ref 0.8–5.3)
LYMPHOCYTES NFR BLD AUTO: 47.3 %
MCH RBC QN AUTO: 29.1 PG (ref 26.5–33)
MCHC RBC AUTO-ENTMCNC: 33.9 G/DL (ref 31.5–36.5)
MCV RBC AUTO: 86 FL (ref 78–100)
MICROALBUMIN UR-MCNC: 12 MG/L
MICROALBUMIN/CREAT UR: 10.8 MG/G CR (ref 0–25)
MONOCYTES # BLD AUTO: 0.4 10E9/L (ref 0–1.3)
MONOCYTES NFR BLD AUTO: 7.3 %
NEUTROPHILS # BLD AUTO: 2.1 10E9/L (ref 1.6–8.3)
NEUTROPHILS NFR BLD AUTO: 40.6 %
NONHDLC SERPL-MCNC: 265 MG/DL
PLATELET # BLD AUTO: 196 10E9/L (ref 150–450)
POTASSIUM SERPL-SCNC: 4.5 MMOL/L (ref 3.4–5.3)
PROT SERPL-MCNC: 7.9 G/DL (ref 6.8–8.8)
RBC # BLD AUTO: 5.19 10E12/L (ref 3.8–5.2)
SODIUM SERPL-SCNC: 134 MMOL/L (ref 133–144)
TIBC SERPL-MCNC: 426 UG/DL (ref 240–430)
TRIGL SERPL-MCNC: 351 MG/DL
TSH SERPL DL<=0.005 MIU/L-ACNC: 1.88 MU/L (ref 0.4–4)
WBC # BLD AUTO: 5.2 10E9/L (ref 4–11)

## 2021-04-22 PROCEDURE — 83036 HEMOGLOBIN GLYCOSYLATED A1C: CPT | Performed by: NURSE PRACTITIONER

## 2021-04-22 PROCEDURE — 99207 PR FOOT EXAM NO CHARGE: CPT | Mod: 25 | Performed by: NURSE PRACTITIONER

## 2021-04-22 PROCEDURE — 36415 COLL VENOUS BLD VENIPUNCTURE: CPT | Performed by: NURSE PRACTITIONER

## 2021-04-22 PROCEDURE — 82728 ASSAY OF FERRITIN: CPT | Performed by: NURSE PRACTITIONER

## 2021-04-22 PROCEDURE — G0145 SCR C/V CYTO,THINLAYER,RESCR: HCPCS | Performed by: NURSE PRACTITIONER

## 2021-04-22 PROCEDURE — 87624 HPV HI-RISK TYP POOLED RSLT: CPT | Performed by: NURSE PRACTITIONER

## 2021-04-22 PROCEDURE — 99214 OFFICE O/P EST MOD 30 MIN: CPT | Mod: 25 | Performed by: NURSE PRACTITIONER

## 2021-04-22 PROCEDURE — 87389 HIV-1 AG W/HIV-1&-2 AB AG IA: CPT | Performed by: NURSE PRACTITIONER

## 2021-04-22 PROCEDURE — 99396 PREV VISIT EST AGE 40-64: CPT | Performed by: NURSE PRACTITIONER

## 2021-04-22 PROCEDURE — 86803 HEPATITIS C AB TEST: CPT | Performed by: NURSE PRACTITIONER

## 2021-04-22 PROCEDURE — 83540 ASSAY OF IRON: CPT | Performed by: NURSE PRACTITIONER

## 2021-04-22 PROCEDURE — 80050 GENERAL HEALTH PANEL: CPT | Performed by: NURSE PRACTITIONER

## 2021-04-22 PROCEDURE — 80061 LIPID PANEL: CPT | Performed by: NURSE PRACTITIONER

## 2021-04-22 PROCEDURE — 82043 UR ALBUMIN QUANTITATIVE: CPT | Performed by: NURSE PRACTITIONER

## 2021-04-22 PROCEDURE — 83550 IRON BINDING TEST: CPT | Performed by: NURSE PRACTITIONER

## 2021-04-22 RX ORDER — METFORMIN HCL 500 MG
1000 TABLET, EXTENDED RELEASE 24 HR ORAL 2 TIMES DAILY WITH MEALS
Qty: 360 TABLET | Refills: 0 | Status: SHIPPED | OUTPATIENT
Start: 2021-04-22 | End: 2021-09-16

## 2021-04-22 ASSESSMENT — ENCOUNTER SYMPTOMS
FEVER: 0
DIZZINESS: 0
ARTHRALGIAS: 0
ABDOMINAL PAIN: 0
HEADACHES: 0
PARESTHESIAS: 0
HEMATURIA: 0
CHILLS: 0
WEAKNESS: 0
EYE PAIN: 0
DIARRHEA: 0
JOINT SWELLING: 0
MYALGIAS: 0
SORE THROAT: 0
PALPITATIONS: 0
NERVOUS/ANXIOUS: 0
COUGH: 0
NAUSEA: 0
HEARTBURN: 0
BREAST MASS: 0
DYSURIA: 0
SHORTNESS OF BREATH: 0
FREQUENCY: 0
HEMATOCHEZIA: 0
CONSTIPATION: 0

## 2021-04-22 ASSESSMENT — MIFFLIN-ST. JEOR: SCORE: 1451.37

## 2021-04-22 NOTE — PROGRESS NOTES
SUBJECTIVE:   CC: Layne Warren is an 55 year old woman who presents for preventive health visit.     Here for physical exam.  New patient to me.     Primary concern today is hair loss- this has been occurring for many months.  Generalized thinning to the point she has been wearing a wig.  Denies specific bald patches.  No scalp rashes or lesions.  She is wondering if her thyroid is off, no previous history of thyroid disease.     Diabetes- diagnosed in 2019, A1C was 10.8 at that time.  Started on Metformin.  She has not been taking it consistently.  She has not had labs or follow-up since 2019.  She has seen diabetic education a few times.     Dyslipidemia- refuses statin.  States her dad had really bad leg pain and muscle loss while taking statins and she would never consider taking one.      History of DVT in her left leg many years ago, occurred during pregnancy.  No reoccurrence.  Left leg has always been a little bigger than her right ever since.      Due for pap today, denies previous abnormal pap smears.     Due for mammogram, last completed in 2018, normal at that time. She reports a history of breast cancer in her sister.  She is estranged from her sister and doesn't have a lot of details.     Colonoscopy completed in 2015- repeat in 10 years.     Patient has been advised of split billing requirements and indicates understanding: Yes  Healthy Habits:     Getting at least 3 servings of Calcium per day:  Yes    Bi-annual eye exam:  NO    Dental care twice a year:  Yes    Sleep apnea or symptoms of sleep apnea:  None    Diet:  Diabetic    Frequency of exercise:  1 day/week    Duration of exercise:  30-45 minutes    Taking medications regularly:  No    Barriers to taking medications:  Problems remembering to take them    Medication side effects:  None    PHQ-2 Total Score: 0    Additional concerns today:  No      Today's PHQ-2 Score:   PHQ-2 ( 1999 [employer]) 4/22/2021   Q1: Little interest or pleasure in  doing things 0   Q2: Feeling down, depressed or hopeless 0   PHQ-2 Score 0   Q1: Little interest or pleasure in doing things Not at all   Q2: Feeling down, depressed or hopeless Not at all   PHQ-2 Score 0       Abuse: Current or Past (Physical, Sexual or Emotional) - No  Do you feel safe in your environment? Yes    Have you ever done Advance Care Planning? (For example, a Health Directive, POLST, or a discussion with a medical provider or your loved ones about your wishes): No, advance care planning information given to patient to review.  Patient plans to discuss their wishes with loved ones or provider.      Social History     Tobacco Use     Smoking status: Never Smoker     Smokeless tobacco: Never Used   Substance Use Topics     Alcohol use: Yes     Alcohol/week: 1.0 standard drinks     Types: 1 Cans of beer per week     Comment: 1 beer a week      If you drink alcohol do you typically have >3 drinks per day or >7 drinks per week? No    Alcohol Use 4/22/2021   Prescreen: >3 drinks/day or >7 drinks/week? No   Prescreen: >3 drinks/day or >7 drinks/week? -       Reviewed orders with patient.  Reviewed health maintenance and updated orders accordingly - Yes  Lab work is in process    Breast Cancer Screening:    FSH-7:   Breast CA Risk Assessment (FHS-7) 4/22/2021   Did any of your first-degree relatives have breast or ovarian cancer? Yes   Did any of your relatives have bilateral breast cancer? Unknown       Pertinent mammograms are reviewed under the imaging tab.    History of abnormal Pap smear: NO - age 30- 65 PAP every 3 years recommended  PAP / HPV Latest Ref Rng & Units 7/3/2017   PAP - NIL   HPV 16 DNA NEG Negative   HPV 18 DNA NEG Negative   OTHER HR HPV NEG Negative     Reviewed and updated as needed this visit by clinical staff  Tobacco  Allergies  Meds  Problems  Med Hx  Surg Hx  Fam Hx          Reviewed and updated as needed this visit by Provider  Tobacco  Allergies  Meds  Problems  Med Hx   "Surg Hx  Fam Hx             Review of Systems   Constitutional: Negative for chills and fever.   HENT: Negative for congestion, ear pain, hearing loss and sore throat.    Eyes: Negative for pain and visual disturbance.   Respiratory: Negative for cough and shortness of breath.    Cardiovascular: Positive for peripheral edema. Negative for chest pain and palpitations.   Gastrointestinal: Negative for abdominal pain, constipation, diarrhea, heartburn, hematochezia and nausea.   Breasts:  Negative for tenderness, breast mass and discharge.   Genitourinary: Negative for dysuria, frequency, genital sores, hematuria, pelvic pain, urgency, vaginal bleeding and vaginal discharge.   Musculoskeletal: Negative for arthralgias, joint swelling and myalgias.   Skin: Negative for rash.   Neurological: Negative for dizziness, weakness, headaches and paresthesias.   Psychiatric/Behavioral: Negative for mood changes. The patient is not nervous/anxious.           OBJECTIVE:   /82   Pulse 78   Temp 97.6  F (36.4  C)   Ht 1.727 m (5' 8\")   Wt 80.3 kg (177 lb)   SpO2 100%   BMI 26.91 kg/m    Physical Exam  GENERAL: healthy, alert and no distress  EYES: Eyes grossly normal to inspection, PERRL and conjunctivae and sclerae normal  HENT: ear canals and TM's normal, nose and mouth without ulcers or lesions  NECK: enlarged left anterior cervical lymph node- non-tender, slightly mobile, approximately 2.5 cm in diameter.  No masses, or scars and thyroid normal to palpation  RESP: lungs clear to auscultation - no rales, rhonchi or wheezes  BREAST: normal without masses, tenderness or nipple discharge and no palpable axillary masses or adenopathy  CV: regular rate and rhythm, normal S1 S2, no S3 or S4, no murmur, click or rub, no peripheral edema and peripheral pulses strong  ABDOMEN: soft, nontender, no hepatosplenomegaly, no masses and bowel sounds normal   (female): normal female external genitalia, normal urethral meatus, " vaginal mucosa pink, moist, well rugated, and normal cervix/adnexa/uterus without masses or discharge  MS: no gross musculoskeletal defects noted, no edema  SKIN: no suspicious lesions or rashes  NEURO: Normal strength and tone, mentation intact and speech normal  PSYCH: mentation appears normal, affect normal/bright  Diabetic foot exam: normal DP and PT pulses, no trophic changes or ulcerative lesions, normal sensory exam, normal monofilament exam and dry cracking heels    Diagnostic Test Results:  Labs reviewed in Epic    ASSESSMENT/PLAN:   1. Routine general medical examination at a health care facility  Continue annual exams.     2. Uncontrolled type 2 diabetes mellitus with hyperglycemia (H)  Hasn't had labs since time of diagnosis in 2019.   Check labs today.  Continue Metformin as long as lab are stable- she hasn't been taking this consistently so emphasize restarting and taking on a consistent basis.  Follow-up in 3-6 months, depending on results.   Refuses statin.   No hx of CAD or CVA.   Encouraged to make diabetic eye exam appointment.   - HEMOGLOBIN A1C  - Lipid panel reflex to direct LDL Fasting  - Albumin Random Urine Quantitative with Creat Ratio  - OPTOMETRY REFERRAL; Future  - AR FOOT EXAM NO CHARGE  - Comprehensive metabolic panel  - metFORMIN (GLUCOPHAGE-XR) 500 MG 24 hr tablet; Take 2 tablets (1,000 mg) by mouth 2 times daily (with meals)  Dispense: 360 tablet; Refill: 0    3. Dyslipidemia  Check labs.  Encouraged statin given diabetic history, declines.   - Lipid panel reflex to direct LDL Fasting    4. Hair loss  Unclear etiology.  Check labs.  Consider referral to dermatology.   - Iron and iron binding capacity  - Ferritin  - CBC with platelets and differential  - TSH with free T4 reflex  - Comprehensive metabolic panel    5. Left cervical lymphadenopathy  Asymptomatic, she wasn't aware of enlargement so unclear how long it has been present.   Check labs and CBC, further plan based on results.  "  - CBC with platelets and differential  - US Head Neck Soft Tissue; Future    6. Screening for HIV (human immunodeficiency virus)  - HIV Antigen Antibody Combo    7. Need for hepatitis C screening test  - Hepatitis C Screen Reflex to HCV RNA Quant and Genotype    8. Screening for malignant neoplasm of cervix   Pap imaged thin layer screen with HPV - recommended age 30 - 65 years (select HPV order below)  - HPV High Risk Types DNA Cervical    9. Encounter for screening mammogram for breast cancer  - MA SCREENING DIGITAL BILAT - Future  (s+30); Future      Patient has been advised of split billing requirements and indicates understanding: Yes  COUNSELING:  Reviewed preventive health counseling, as reflected in patient instructions    Estimated body mass index is 26.91 kg/m  as calculated from the following:    Height as of this encounter: 1.727 m (5' 8\").    Weight as of this encounter: 80.3 kg (177 lb).    Weight management plan: Discussed healthy diet and exercise guidelines    She reports that she has never smoked. She has never used smokeless tobacco.      Counseling Resources:  ATP IV Guidelines  Pooled Cohorts Equation Calculator  Breast Cancer Risk Calculator  BRCA-Related Cancer Risk Assessment: FHS-7 Tool  FRAX Risk Assessment  ICSI Preventive Guidelines  Dietary Guidelines for Americans, 2010  USDA's MyPlate  ASA Prophylaxis  Lung CA Screening    Krystle Ngo, [facility]  "

## 2021-04-22 NOTE — PATIENT INSTRUCTIONS
Diabetes-   Goal is to have your A1C less than 7.  We see you in the clinic every 3 months if you are not at goal and every 6 months if you are at goal.   Very important to follow-up with these appointment.    We will check your labs today and I'll get back to you with results.    Please make an appointment for a diabetic eye exam- should be done yearly, referral placed.   It is recommended to be on a statin (cholesterol lowering) medication with diabetes to reduce your risk of heart attack and stroke.  Please think about trying a low dose medication- we could try pravastatin which has less incidence of any muscle issues.     Hair loss-   Will check several labs today.   We will check several labs today.  This might be related to uncontrolled diabetes.  One option would be to send you to dermatology if we are not finding answers.     Enlarge lymph node- please make an appointment for an ultrasound. [phone].  I will get back to you with results.     Please make an appointment for a mammogram, should be done every year.  [phone].  Because of breast cancer in your sister, it might be a good idea to see a genetic counselor- please let me know if you are interested.             Preventive Health Recommendations  Female Ages 50 - 64    Yearly exam: See your health care provider every year in order to  o Review health changes.   o Discuss preventive care.    o Review your medicines if your doctor has prescribed any.      Get a Pap test every three years (unless you have an abnormal result and your provider advises testing more often).    If you get Pap tests with HPV test, you only need to test every 5 years, unless you have an abnormal result.     You do not need a Pap test if your uterus was removed (hysterectomy) and you have not had cancer.    You should be tested each year for STDs (sexually transmitted diseases) if you're at risk.     Have a mammogram every 1 to 2 years.    Have a colonoscopy at age 50, or  have a yearly FIT test (stool test). These exams screen for colon cancer.      Have a cholesterol test every 5 years, or more often if advised.    Have a diabetes test (fasting glucose) every three years. If you are at risk for diabetes, you should have this test more often.     If you are at risk for osteoporosis (brittle bone disease), think about having a bone density scan (DEXA).    Shots: Get a flu shot each year. Get a tetanus shot every 10 years.    Nutrition:     Eat at least 5 servings of fruits and vegetables each day.    Eat whole-grain bread, whole-wheat pasta and brown rice instead of white grains and rice.    Get adequate Calcium and Vitamin D.     Lifestyle    Exercise at least 150 minutes a week (30 minutes a day, 5 days a week). This will help you control your weight and prevent disease.    Limit alcohol to one drink per day.    No smoking.     Wear sunscreen to prevent skin cancer.     See your dentist every six months for an exam and cleaning.    See your eye doctor every 1 to 2 years.

## 2021-04-23 LAB — HCV AB SERPL QL IA: NONREACTIVE

## 2021-04-26 ENCOUNTER — ANCILLARY PROCEDURE (OUTPATIENT)
Dept: ULTRASOUND IMAGING | Facility: CLINIC | Age: 56
End: 2021-04-26
Attending: NURSE PRACTITIONER
Payer: COMMERCIAL

## 2021-04-26 DIAGNOSIS — R59.0 LEFT CERVICAL LYMPHADENOPATHY: ICD-10-CM

## 2021-04-26 LAB
COPATH REPORT: NORMAL
PAP: NORMAL

## 2021-04-27 DIAGNOSIS — E04.1 THYROID NODULE: Primary | ICD-10-CM

## 2021-04-27 LAB
FINAL DIAGNOSIS: NORMAL
HPV HR 12 DNA CVX QL NAA+PROBE: NEGATIVE
HPV16 DNA SPEC QL NAA+PROBE: NEGATIVE
HPV18 DNA SPEC QL NAA+PROBE: NEGATIVE
SPECIMEN DESCRIPTION: NORMAL
SPECIMEN SOURCE CVX/VAG CYTO: NORMAL

## 2021-05-06 ENCOUNTER — ANCILLARY PROCEDURE (OUTPATIENT)
Dept: MAMMOGRAPHY | Facility: CLINIC | Age: 56
End: 2021-05-06
Attending: NURSE PRACTITIONER
Payer: COMMERCIAL

## 2021-05-06 ENCOUNTER — OFFICE VISIT (OUTPATIENT)
Dept: SURGERY | Facility: CLINIC | Age: 56
End: 2021-05-06
Payer: COMMERCIAL

## 2021-05-06 VITALS
SYSTOLIC BLOOD PRESSURE: 121 MMHG | HEART RATE: 74 BPM | DIASTOLIC BLOOD PRESSURE: 76 MMHG | WEIGHT: 177 LBS | BODY MASS INDEX: 26.91 KG/M2

## 2021-05-06 DIAGNOSIS — E04.1 THYROID NODULE: Primary | ICD-10-CM

## 2021-05-06 DIAGNOSIS — Z12.31 ENCOUNTER FOR SCREENING MAMMOGRAM FOR BREAST CANCER: ICD-10-CM

## 2021-05-06 PROCEDURE — 77063 BREAST TOMOSYNTHESIS BI: CPT | Mod: TC | Performed by: RADIOLOGY

## 2021-05-06 PROCEDURE — 77067 SCR MAMMO BI INCL CAD: CPT | Mod: TC | Performed by: RADIOLOGY

## 2021-05-06 PROCEDURE — 99204 OFFICE O/P NEW MOD 45 MIN: CPT | Performed by: SURGERY

## 2021-05-06 NOTE — PROGRESS NOTES
I was asked to see Layne Warren regarding thyroid nodules by Imani Black     Layne Warren is a 55 year old female with thyroid nodules. This was found on physical exam. The patient reports that there has been no noticible growth recently. There are no associated symptoms of dysphagia, dysphonia, or dyspnea. The patient denies a family history of thyroid cancer or a history of exposure to ionizing radiation. The TSH was 1.88 on 21.  An ultrasound on 21 revealed Multiple bilateral thyroid nodules measure up to 2.6 cm on the left  thyroid lobe. AND a 1.9 cm normal-appearing left level II cervical lymph node. No abnormal-appearing cervical lymph nodes noted..  She is here to discuss FNA.    Past Medical History:   has a past medical history of Diabetes mellitus, type 2 (H) (2020) and Herpes, genital.    Past Surgical History:  Past Surgical History:   Procedure Laterality Date     C ANESTH, SECTION       TUBAL LIGATION          Social History:  Social History     Socioeconomic History     Marital status: Single     Spouse name: Not on file     Number of children: Not on file     Years of education: Not on file     Highest education level: Not on file   Occupational History     Not on file   Social Needs     Financial resource strain: Not on file     Food insecurity     Worry: Not on file     Inability: Not on file     Transportation needs     Medical: Not on file     Non-medical: Not on file   Tobacco Use     Smoking status: Never Smoker     Smokeless tobacco: Never Used   Substance and Sexual Activity     Alcohol use: Yes     Alcohol/week: 1.0 standard drinks     Types: 1 Cans of beer per week     Comment: 1 beer a week      Drug use: No     Sexual activity: Yes     Partners: Male     Birth control/protection: Female Surgical     Comment: Tubal   Lifestyle     Physical activity     Days per week: Not on file     Minutes per session: Not on file     Stress: Not on file    Relationships     Social connections     Talks on phone: Not on file     Gets together: Not on file     Attends Orthodox service: Not on file     Active member of club or organization: Not on file     Attends meetings of clubs or organizations: Not on file     Relationship status: Not on file     Intimate partner violence     Fear of current or ex partner: Not on file     Emotionally abused: Not on file     Physically abused: Not on file     Forced sexual activity: Not on file   Other Topics Concern     Parent/sibling w/ CABG, MI or angioplasty before 65F 55M? Not Asked   Social History Narrative     Not on file        Family History:  Family History   Problem Relation Age of Onset     C.A.D. Father      Diabetes Paternal Grandmother        ROS: 10 point ROS neg other than the symptoms noted above in the HPI.      PHYSICAL EXAMINATION: A comprehensive head and neck examination was performed. Of note, there is a palpable level II lymph node, no palpable enlargement of the thyroid gland. There were no other palpable masses or adenopathy.    Vitals: /76   Pulse 74   Wt 80.3 kg (177 lb)   BMI 26.91 kg/m    BMI= Body mass index is 26.91 kg/m .  Constitutional: healthy, alert and no distress  Head: Normocephalic. No masses, lesions, tenderness or abnormalities  Neck: Neck supple  ENT: Mucous membranes moist  Respiratory:  Non-labored respiration  Musculoskeletal: No gross deformity  Neurologic: No focal deficits  Psychiatric: mentation appears normal and affect normal/bright  Hematologic/Lymphatic/Immunologic: No bruising noted    IMPRESSION: thyroid nodule with lymphadenopathy    Plan:    Given the appearance and size of the nodules an FNA is the next step. The risks, benefits and alternatives of FNA were discussed, including (but not limited to) pain, bleeding, infection, and the potential need for additional FNAs or surgery. We discussed the possible results and briefly what each would mean. We also briefly  discussed surgery. She is agreeable to an FNA and we have taken the liberty of arranging this.

## 2021-05-17 ENCOUNTER — TELEPHONE (OUTPATIENT)
Dept: SURGERY | Facility: CLINIC | Age: 56
End: 2021-05-17

## 2021-05-17 NOTE — TELEPHONE ENCOUNTER
Type of surgery: In office FNA  CPT 70275   Thyroid nodule E04.1    Location of surgery: [facility]  Date and time of surgery: 6-3-21 @ 8:00 AM  Surgeon: Saran  Pre-Op Appt Date: NA  Post-Op Appt Date: NA   Packet sent out: Not Applicable  Pre-cert/Authorization completed: No prior auth required per [employer] site.  Date: 5-17-21    Kelsi Lam  Prior Authorization Dept  [phone]

## 2021-06-03 ENCOUNTER — ANCILLARY PROCEDURE (OUTPATIENT)
Dept: ULTRASOUND IMAGING | Facility: CLINIC | Age: 56
End: 2021-06-03
Payer: COMMERCIAL

## 2021-06-03 ENCOUNTER — OFFICE VISIT (OUTPATIENT)
Dept: SURGERY | Facility: CLINIC | Age: 56
End: 2021-06-03
Payer: COMMERCIAL

## 2021-06-03 ENCOUNTER — ANCILLARY PROCEDURE (OUTPATIENT)
Dept: ULTRASOUND IMAGING | Facility: CLINIC | Age: 56
End: 2021-06-03
Attending: SURGERY
Payer: COMMERCIAL

## 2021-06-03 DIAGNOSIS — E04.1 THYROID NODULE: Primary | ICD-10-CM

## 2021-06-03 DIAGNOSIS — E04.1 THYROID NODULE: ICD-10-CM

## 2021-06-03 PROCEDURE — 88173 CYTOPATH EVAL FNA REPORT: CPT | Performed by: PATHOLOGY

## 2021-06-03 PROCEDURE — 10006 FNA BX W/US GDN EA ADDL: CPT | Performed by: SURGERY

## 2021-06-03 PROCEDURE — 10005 FNA BX W/US GDN 1ST LES: CPT | Performed by: SURGERY

## 2021-06-03 NOTE — LETTER
6/3/2021         RE: Layne Warren  [address] [facility] [address]        Dear Colleague,    Thank you for referring your patient, Layne Warren, to the [facility]. Please see a copy of my visit note below.    Thyroid nodule FNA.   Using an ultrasound machine multiple gray scale images of the neck were obtained in both the transverse and longitudinal planes with the patient supine in the examination chair after informed consent was obtained. This revealed a thyroid nodule(s) that met the current CARLOS guidelines for FNA and an abnormal by size lymph node  The skin was prepped and a total of 3 mL of 1% lidocaine with epinephrine was injected into the planned biopsy site(s).  Using the US for needle guidance, 3 passes were made into the left level 2 lymph node using 25g needles.   Using the US for needle guidance, 3 passes were made into the left thyroid nodule using 25g needles.     The patient tolerated the procedure well. No blood loss or complications. The slides were prepared and sent to pathology. Will call the patient with results.         Again, thank you for allowing me to participate in the care of your patient.        Sincerely,        Kendell Noonan DO

## 2021-06-07 ENCOUNTER — MYC MEDICAL ADVICE (OUTPATIENT)
Dept: FAMILY MEDICINE | Facility: CLINIC | Age: 56
End: 2021-06-07

## 2021-06-07 LAB — COPATH REPORT: NORMAL

## 2021-06-11 ENCOUNTER — MYC MEDICAL ADVICE (OUTPATIENT)
Dept: SURGERY | Facility: CLINIC | Age: 56
End: 2021-06-11

## 2021-06-14 NOTE — PROGRESS NOTES
Thyroid nodule FNA.   Using an ultrasound machine multiple gray scale images of the neck were obtained in both the transverse and longitudinal planes with the patient supine in the examination chair after informed consent was obtained. This revealed a thyroid nodule(s) that met the current CARLOS guidelines for FNA and an abnormal by size lymph node  The skin was prepped and a total of 3 mL of 1% lidocaine with epinephrine was injected into the planned biopsy site(s).  Using the US for needle guidance, 3 passes were made into the left level 2 lymph node using 25g needles.   Using the US for needle guidance, 3 passes were made into the left thyroid nodule using 25g needles.     The patient tolerated the procedure well. No blood loss or complications. The slides were prepared and sent to pathology. Will call the patient with results.

## 2021-06-23 DIAGNOSIS — E04.1 THYROID NODULE: Primary | ICD-10-CM

## 2021-07-19 ENCOUNTER — OFFICE VISIT (OUTPATIENT)
Dept: OPTOMETRY | Facility: CLINIC | Age: 56
End: 2021-07-19
Payer: COMMERCIAL

## 2021-07-19 DIAGNOSIS — H52.4 PRESBYOPIA: ICD-10-CM

## 2021-07-19 DIAGNOSIS — E11.9 TYPE 2 DIABETES MELLITUS WITHOUT COMPLICATION, WITHOUT LONG-TERM CURRENT USE OF INSULIN (H): Primary | ICD-10-CM

## 2021-07-19 DIAGNOSIS — H52.02 HYPEROPIA, LEFT: ICD-10-CM

## 2021-07-19 DIAGNOSIS — H52.223 REGULAR ASTIGMATISM OF BOTH EYES: ICD-10-CM

## 2021-07-19 PROCEDURE — 92004 COMPRE OPH EXAM NEW PT 1/>: CPT | Performed by: OPTOMETRIST

## 2021-07-19 PROCEDURE — 92015 DETERMINE REFRACTIVE STATE: CPT | Performed by: OPTOMETRIST

## 2021-07-19 ASSESSMENT — KERATOMETRY
OS_K2POWER_DIOPTERS: 44.25
OD_AXISANGLE2_DEGREES: 155
OS_AXISANGLE2_DEGREES: 2
OS_K1POWER_DIOPTERS: 44.00
OD_K2POWER_DIOPTERS: 45.00
OD_K1POWER_DIOPTERS: 43.75

## 2021-07-19 ASSESSMENT — REFRACTION_MANIFEST
OS_CYLINDER: +0.75
OS_SPHERE: +1.00
OS_AXIS: 171
OD_AXIS: 051
OS_CYLINDER: +0.50
OD_CYLINDER: +1.00
OS_AXIS: 180
OS_SPHERE: +1.00
OS_ADD: +2.00
OD_CYLINDER: +1.00
OD_SPHERE: -0.75
OD_AXIS: 050
OD_ADD: +2.00
OD_SPHERE: -0.75
METHOD_AUTOREFRACTION: 1

## 2021-07-19 ASSESSMENT — VISUAL ACUITY
OD_SC: 20/25
METHOD: SNELLEN - LINEAR
OD_SC: 20/30
OS_PH_SC: 20/30
OS_PH_SC+: -1
OS_SC+: -1
OS_SC: 20/200
OS_SC: 20/40
OD_SC+: -2

## 2021-07-19 ASSESSMENT — TONOMETRY
OS_IOP_MMHG: 13
IOP_METHOD: APPLANATION
OD_IOP_MMHG: 13

## 2021-07-19 ASSESSMENT — CONF VISUAL FIELD
OD_NORMAL: 1
OS_NORMAL: 1
METHOD: COUNTING FINGERS

## 2021-07-19 ASSESSMENT — SLIT LAMP EXAM - LIDS
COMMENTS: NORMAL
COMMENTS: NORMAL

## 2021-07-19 ASSESSMENT — CUP TO DISC RATIO
OS_RATIO: 0.4
OD_RATIO: 0.4

## 2021-07-19 ASSESSMENT — EXTERNAL EXAM - LEFT EYE: OS_EXAM: NORMAL

## 2021-07-19 ASSESSMENT — EXTERNAL EXAM - RIGHT EYE: OD_EXAM: NORMAL

## 2021-07-19 NOTE — PROGRESS NOTES
Chief Complaint   Patient presents with     Diabetic Eye Exam     Diabetic Eye Exam       diabetes 2, no use of insulin  ,  diagnosis 2019   notices blurry distance vision sometimes    Hemoglobin A1C   Date Value Ref Range Status   04/22/2021 9.3 (H) 0 - 5.6 % Final     Comment:     Results confirmed by repeat test  Normal <5.7% Prediabetes 5.7-6.4%  Diabetes 6.5% or higher - adopted from ADA   consensus guidelines.     12/13/2019 10.8 (H) 0 - 5.6 % Final     Comment:     Normal <5.7% Prediabetes 5.7-6.4%  Diabetes 6.5% or higher - adopted from ADA   consensus guidelines.     12/01/2019 10.7 (H) 0 - 5.6 % Final     Comment:     Results confirmed by repeat test  Normal <5.7% Prediabetes 5.7-6.4%  Diabetes 6.5% or higher - adopted from ADA   consensus guidelines.           Last Eye Exam: 10 years or so ago   Dilated Previously: Yes    What are you currently using to see?  Readers, not sure what the strength is. And she did not bring them today     Distance Vision Acuity: Satisfied with vision, no changes     Near Vision Acuity: Not satisfied, hard to see tiny/small print, uses OTC readers as needed      Eye Comfort: good  Do you use eye drops? : No  Occupation or Hobbies: [employer]     [employer]      Medical, surgical and family histories reviewed and updated 7/19/2021.       OBJECTIVE: See Ophthalmology exam    ASSESSMENT:    ICD-10-CM    1. Type 2 diabetes mellitus without complication, without long-term current use of insulin (H)  E11.9 EYE EXAM (SIMPLE-NONBILLABLE)     REFRACTION   2. Regular astigmatism of both eyes  H52.223 EYE EXAM (SIMPLE-NONBILLABLE)     REFRACTION   3. Hyperopia, left  H52.02 EYE EXAM (SIMPLE-NONBILLABLE)     REFRACTION   4. Presbyopia  H52.4 EYE EXAM (SIMPLE-NONBILLABLE)     REFRACTION      PLAN:    Layne Warren aware  eye exam results will be sent to [facility]  Patient Instructions   Prescription reading glasses advised  Keep blood sugar under good  control  Return in 1 year for diabetic eye exam      Blood sugar and blood pressure control are very important in the prevention of ocular complications from diabetes.   Blanche Childs, OD  [phone]

## 2021-07-19 NOTE — LETTER
7/19/2021         RE: Layne Warren  [address] [facility] [address]        Dear Colleague,    Thank you for referring your patient, Layne aWrren, to the [facility]. No diabetic retinopathy was found at eye exam.     Again, thank you for allowing me to participate in the care of your patient.        Sincerely,        Blanche Childs, OD

## 2021-07-19 NOTE — PATIENT INSTRUCTIONS
Prescription reading glasses advised  Keep blood sugar under good control  Return in 1 year for diabetic eye exam      Blood sugar and blood pressure control are very important in the prevention of ocular complications from diabetes.   Blanche Childs, OD  [phone]

## 2021-08-01 ENCOUNTER — HEALTH MAINTENANCE LETTER (OUTPATIENT)
Age: 56
End: 2021-08-01

## 2021-09-14 ENCOUNTER — MYC MEDICAL ADVICE (OUTPATIENT)
Dept: FAMILY MEDICINE | Facility: CLINIC | Age: 56
End: 2021-09-14

## 2021-09-14 DIAGNOSIS — E11.65 UNCONTROLLED TYPE 2 DIABETES MELLITUS WITH HYPERGLYCEMIA (H): ICD-10-CM

## 2021-09-16 RX ORDER — METFORMIN HCL 500 MG
1000 TABLET, EXTENDED RELEASE 24 HR ORAL 2 TIMES DAILY WITH MEALS
Qty: 360 TABLET | Refills: 0 | Status: SHIPPED | OUTPATIENT
Start: 2021-09-16

## 2021-09-26 ENCOUNTER — HEALTH MAINTENANCE LETTER (OUTPATIENT)
Age: 56
End: 2021-09-26

## 2021-11-21 ENCOUNTER — HEALTH MAINTENANCE LETTER (OUTPATIENT)
Age: 56
End: 2021-11-21

## 2022-03-13 ENCOUNTER — HEALTH MAINTENANCE LETTER (OUTPATIENT)
Age: 57
End: 2022-03-13

## 2022-07-03 ENCOUNTER — HEALTH MAINTENANCE LETTER (OUTPATIENT)
Age: 57
End: 2022-07-03

## 2022-07-28 ENCOUNTER — ANCILLARY PROCEDURE (OUTPATIENT)
Dept: ULTRASOUND IMAGING | Facility: CLINIC | Age: 57
End: 2022-07-28
Attending: SURGERY
Payer: COMMERCIAL

## 2022-07-28 DIAGNOSIS — E04.1 THYROID NODULE: ICD-10-CM

## 2022-07-28 PROCEDURE — 76536 US EXAM OF HEAD AND NECK: CPT | Mod: TC | Performed by: RADIOLOGY

## 2022-08-03 DIAGNOSIS — E04.1 THYROID NODULE: Primary | ICD-10-CM

## 2022-08-28 ENCOUNTER — HEALTH MAINTENANCE LETTER (OUTPATIENT)
Age: 57
End: 2022-08-28

## 2023-04-23 ENCOUNTER — HEALTH MAINTENANCE LETTER (OUTPATIENT)
Age: 58
End: 2023-04-23

## 2023-07-16 ENCOUNTER — HEALTH MAINTENANCE LETTER (OUTPATIENT)
Age: 58
End: 2023-07-16

## 2023-09-24 ENCOUNTER — HEALTH MAINTENANCE LETTER (OUTPATIENT)
Age: 58
End: 2023-09-24

## 2024-02-11 ENCOUNTER — HEALTH MAINTENANCE LETTER (OUTPATIENT)
Age: 59
End: 2024-02-11

## 2024-06-30 ENCOUNTER — HEALTH MAINTENANCE LETTER (OUTPATIENT)
Age: 59
End: 2024-06-30

## 2024-09-08 ENCOUNTER — HEALTH MAINTENANCE LETTER (OUTPATIENT)
Age: 59
End: 2024-09-08

## 2024-11-17 ENCOUNTER — HEALTH MAINTENANCE LETTER (OUTPATIENT)
Age: 59
End: 2024-11-17

## 2025-03-08 ENCOUNTER — HEALTH MAINTENANCE LETTER (OUTPATIENT)
Age: 60
End: 2025-03-08

## 2025-06-22 ENCOUNTER — HEALTH MAINTENANCE LETTER (OUTPATIENT)
Age: 60
End: 2025-06-22